# Patient Record
Sex: MALE | Race: WHITE | NOT HISPANIC OR LATINO | URBAN - NONMETROPOLITAN AREA
[De-identification: names, ages, dates, MRNs, and addresses within clinical notes are randomized per-mention and may not be internally consistent; named-entity substitution may affect disease eponyms.]

---

## 2023-05-22 ENCOUNTER — OFFICE VISIT (OUTPATIENT)
Dept: PRIMARY CARE | Facility: CLINIC | Age: 68
End: 2023-05-22
Payer: MEDICARE

## 2023-05-22 VITALS
TEMPERATURE: 98.6 F | WEIGHT: 167.8 LBS | HEIGHT: 68 IN | OXYGEN SATURATION: 96 % | DIASTOLIC BLOOD PRESSURE: 74 MMHG | SYSTOLIC BLOOD PRESSURE: 132 MMHG | BODY MASS INDEX: 25.43 KG/M2 | HEART RATE: 88 BPM

## 2023-05-22 DIAGNOSIS — R42 VERTIGO: Primary | ICD-10-CM

## 2023-05-22 PROBLEM — N40.0 BPH (BENIGN PROSTATIC HYPERPLASIA): Status: ACTIVE | Noted: 2023-05-22

## 2023-05-22 PROBLEM — Z87.891 FORMER SMOKER: Status: ACTIVE | Noted: 2023-05-22

## 2023-05-22 PROCEDURE — 1159F MED LIST DOCD IN RCRD: CPT | Performed by: PHYSICIAN ASSISTANT

## 2023-05-22 PROCEDURE — 99204 OFFICE O/P NEW MOD 45 MIN: CPT | Performed by: PHYSICIAN ASSISTANT

## 2023-05-22 PROCEDURE — 1160F RVW MEDS BY RX/DR IN RCRD: CPT | Performed by: PHYSICIAN ASSISTANT

## 2023-05-22 PROCEDURE — 1036F TOBACCO NON-USER: CPT | Performed by: PHYSICIAN ASSISTANT

## 2023-05-22 RX ORDER — CIPROFLOXACIN 500 MG/1
TABLET ORAL 2 TIMES DAILY
COMMUNITY
Start: 2021-03-03 | End: 2023-05-22 | Stop reason: WASHOUT

## 2023-05-22 RX ORDER — MECLIZINE HYDROCHLORIDE 25 MG/1
25 TABLET ORAL 2 TIMES DAILY
COMMUNITY
Start: 2023-05-13 | End: 2023-05-22 | Stop reason: SDUPTHER

## 2023-05-22 RX ORDER — ASPIRIN 81 MG/1
81 TABLET ORAL
COMMUNITY
Start: 2023-04-24

## 2023-05-22 RX ORDER — ATORVASTATIN CALCIUM 20 MG/1
20 TABLET, FILM COATED ORAL DAILY
COMMUNITY

## 2023-05-22 RX ORDER — TAMSULOSIN HYDROCHLORIDE 0.4 MG/1
CAPSULE ORAL
COMMUNITY
Start: 2022-10-26

## 2023-05-22 RX ORDER — ATORVASTATIN CALCIUM 80 MG/1
80 TABLET, FILM COATED ORAL DAILY
COMMUNITY
Start: 2023-05-13 | End: 2023-05-22 | Stop reason: WASHOUT

## 2023-05-22 RX ORDER — ATORVASTATIN CALCIUM 20 MG/1
20 TABLET, FILM COATED ORAL NIGHTLY
COMMUNITY
Start: 2022-10-06 | End: 2023-05-22 | Stop reason: WASHOUT

## 2023-05-22 RX ORDER — MECLIZINE HYDROCHLORIDE 25 MG/1
25 TABLET ORAL 2 TIMES DAILY PRN
Qty: 30 TABLET | Refills: 2 | Status: SHIPPED | OUTPATIENT
Start: 2023-05-22 | End: 2023-09-06 | Stop reason: SDUPTHER

## 2023-05-22 RX ORDER — TRAMADOL HYDROCHLORIDE 50 MG/1
50 TABLET ORAL 2 TIMES DAILY PRN
COMMUNITY
Start: 2023-05-10

## 2023-05-22 NOTE — PROGRESS NOTES
Subjective   Patient ID: Chato Reilly is a 67 y.o. male who presents for New Patient Visit and Hospital Follow-up (Was in the Friday before last for vertigo. ).    HPI   Chato Reilly is a 67 y.o. year old male patient with presenting to clinic with concern for   Chief Complaint   Patient presents with    New Patient Visit    Hospital Follow-up     Was in the Friday before last for vertigo.            Patient Active Problem List   Diagnosis    Stented coronary artery    Low back pain without sciatica    Former smoker    BPH (benign prostatic hyperplasia)       History reviewed. No pertinent past medical history.   Past Surgical History:   Procedure Laterality Date    MR HEAD ANGIO WO IV CONTRAST  5/12/2023    MR HEAD ANGIO WO IV CONTRAST 5/12/2023 GEN MRI    MR NECK ANGIO WO IV CONTRAST  5/12/2023    MR NECK ANGIO WO IV CONTRAST 5/12/2023 GEN MRI      No family history on file.   Social History     Tobacco Use    Smoking status: Never    Smokeless tobacco: Never   Vaping Use    Vaping status: Not on file   Substance Use Topics    Alcohol use: Never                      Current Outpatient Medications:     aspirin 81 mg EC tablet, Take 1 tablet (81 mg) by mouth once daily., Disp: , Rfl:     FINASTERIDE ORAL, Take by mouth., Disp: , Rfl:     tamsulosin (Flomax) 0.4 mg 24 hr capsule, , Disp: , Rfl:     traMADol (Ultram) 50 mg tablet, Take 1 tablet (50 mg) by mouth 2 times a day as needed., Disp: , Rfl:     atorvastatin (Lipitor) 20 mg tablet, Take 1 tablet (20 mg) by mouth once daily., Disp: , Rfl:     meclizine (Antivert) 25 mg tablet, Take 1 tablet (25 mg) by mouth 2 times a day as needed for dizziness., Disp: 30 tablet, Rfl: 2          Review of Systems  Review of Systems:   Constitutional: Denies fever  HEENT: Denies ST, earache  CVS: Denies Chest pain  Pulmonary: Denies wheezing, SOB  GI: Denies N/V  : Denies dysuria  Musculoskeletal:  Denies myalgia  Neuro: Denies focal weakness or numbness.  Skin: Denies  "Rashes.  *Review of Systems is negative unless otherwise mentioned in HPI or ROS above.      Objective   /74   Pulse 88   Temp 37 °C (98.6 °F)   Ht 1.727 m (5' 8\")   Wt 76.1 kg (167 lb 12.8 oz)   SpO2 96%   BMI 25.51 kg/m²     Physical Exam  Physical exam:  /74   Pulse 88   Temp 37 °C (98.6 °F)   Ht 1.727 m (5' 8\")   Wt 76.1 kg (167 lb 12.8 oz)   SpO2 96%   BMI 25.51 kg/m²  reviewed Body mass index is 25.51 kg/m².   Constitutional: NAD. Afebrile. Resting comfortably.  ENT: Nasal mucosa and oropharynx: moist oral mucosa. Posterior oropharynx nonerythematous. Modrerate clear posterior pharyngeal streaking.  TM: Bilat TM nonerythematous, pearly grey, landmarks intact. EAC wnl bilat.  Eyes: PERRLA. EOM intact.   Lymph: No anterior cervical chain or submandibular lymphadenopathy. No sentinel lymph nodes.  Cardiac: Regular rate & rhythm. No murmur, gallops, or rubs.  Pulmonary: Lungs clear to auscultation bilaterally with good aeration. No wheezes, rhonchi, or rales.   GI: Soft, Nontender, nondistended. No guarding. Normal BS x4.  : No suprapubic tenderness. No CVA tenderness to percussion.   Musculoskeletal: No peripheral edema.   Skin: No evidence of trauma. No rashes  Neuro: No focal neuro deficits. Normal gait without assistive devices.  Psych: Intact judgement and insight.    Assessment/Plan   Problem List Items Addressed This Visit    None  Visit Diagnoses       Vertigo    -  Primary    Relevant Medications    meclizine (Antivert) 25 mg tablet               "

## 2023-08-31 ENCOUNTER — TELEPHONE (OUTPATIENT)
Dept: PRIMARY CARE | Facility: CLINIC | Age: 68
End: 2023-08-31
Payer: MEDICARE

## 2023-08-31 DIAGNOSIS — R42 VERTIGO: Primary | ICD-10-CM

## 2023-09-06 DIAGNOSIS — R42 VERTIGO: ICD-10-CM

## 2023-09-06 RX ORDER — MECLIZINE HYDROCHLORIDE 25 MG/1
25 TABLET ORAL 2 TIMES DAILY PRN
Qty: 90 TABLET | Refills: 2 | Status: SHIPPED | OUTPATIENT
Start: 2023-09-06 | End: 2023-12-12

## 2023-11-27 ENCOUNTER — APPOINTMENT (OUTPATIENT)
Dept: PRIMARY CARE | Facility: CLINIC | Age: 68
End: 2023-11-27
Payer: MEDICARE

## 2023-12-12 ENCOUNTER — OFFICE VISIT (OUTPATIENT)
Dept: PRIMARY CARE | Facility: CLINIC | Age: 68
End: 2023-12-12
Payer: MEDICARE

## 2023-12-12 VITALS
TEMPERATURE: 98.6 F | WEIGHT: 169.8 LBS | HEIGHT: 68 IN | DIASTOLIC BLOOD PRESSURE: 66 MMHG | BODY MASS INDEX: 25.73 KG/M2 | OXYGEN SATURATION: 99 % | HEART RATE: 75 BPM | SYSTOLIC BLOOD PRESSURE: 124 MMHG

## 2023-12-12 DIAGNOSIS — Z87.891 HISTORY OF TOBACCO USE, PRESENTING HAZARDS TO HEALTH: ICD-10-CM

## 2023-12-12 DIAGNOSIS — E78.2 MIXED HYPERLIPIDEMIA: Primary | ICD-10-CM

## 2023-12-12 PROCEDURE — 1160F RVW MEDS BY RX/DR IN RCRD: CPT | Performed by: PHYSICIAN ASSISTANT

## 2023-12-12 PROCEDURE — 99214 OFFICE O/P EST MOD 30 MIN: CPT | Performed by: PHYSICIAN ASSISTANT

## 2023-12-12 PROCEDURE — 1036F TOBACCO NON-USER: CPT | Performed by: PHYSICIAN ASSISTANT

## 2023-12-12 PROCEDURE — 1159F MED LIST DOCD IN RCRD: CPT | Performed by: PHYSICIAN ASSISTANT

## 2023-12-12 ASSESSMENT — PATIENT HEALTH QUESTIONNAIRE - PHQ9
1. LITTLE INTEREST OR PLEASURE IN DOING THINGS: NOT AT ALL
SUM OF ALL RESPONSES TO PHQ9 QUESTIONS 1 AND 2: 0
2. FEELING DOWN, DEPRESSED OR HOPELESS: NOT AT ALL
2. FEELING DOWN, DEPRESSED OR HOPELESS: NOT AT ALL
SUM OF ALL RESPONSES TO PHQ9 QUESTIONS 1 AND 2: 0
1. LITTLE INTEREST OR PLEASURE IN DOING THINGS: NOT AT ALL

## 2023-12-12 NOTE — PROGRESS NOTES
Subjective     HPI   Chato Reilly is a 68 y.o. year old male patient with presenting to clinic with concern for   Chief Complaint   Patient presents with    Follow-up       Low Back pain- Pain Mgt- Tyler Christine [facility]    [place] [facility] [place]. Good diet.     BPH- took tamsulosin 0.4mg every day- not currently taking.  Finasteride- currently taking.  Vertigo- intermittently and rarely    Benign localized hyperplasia of prostate without urinary obstruction and other lower urinary tract symptoms (LUTS)   Degeneration of intervertebral disc,neck and back   HTN (hypertension)   Hyperlipidemia   NSTEMI (non-ST elevated myocardial infarction) (HCC) 2011   Osteoarthrosis generalized     Needs LDCT- 46 pack year. Has NOT had screening. Quit 2016 after stent.     Patient Active Problem List   Diagnosis    Stented coronary artery    Low back pain without sciatica    Former smoker    BPH (benign prostatic hyperplasia)       Past Medical History:   Diagnosis Date    HLD (hyperlipidemia)     resolved. On Lipitor to prevent MI    HTN (hypertension)     MI (myocardial infarction) (CMS/HCC) 2021    NSTEMI, stent placed      Past Surgical History:   Procedure Laterality Date    CORONARY STENT PLACEMENT      MR HEAD ANGIO WO IV CONTRAST  2023    MR HEAD ANGIO WO IV CONTRAST 2023 GEN MRI    MR NECK ANGIO WO IV CONTRAST  2023    MR NECK ANGIO WO IV CONTRAST 2023 GEN MRI      Family History   Problem Relation Name Age of Onset    No Known Problems Mother      Heart attack Father  36        Multiple heart attacks    Heart disease Father          CABG x 5      Social History     Tobacco Use    Smoking status: Former     Packs/day: 1.00     Years: 46.00     Additional pack years: 0.00     Total pack years: 46.00     Types: Cigarettes     Start date:      Quit date: 2016     Years since quittin.9    Smokeless tobacco: Former     Types: Chew    Tobacco  "comments:     Stopped smoking when he got his cardiac stent   Substance Use Topics    Alcohol use: Never        Current Outpatient Medications:     aspirin 81 mg EC tablet, Take 1 tablet (81 mg) by mouth once daily., Disp: , Rfl:     FINASTERIDE ORAL, Take by mouth., Disp: , Rfl:     meclizine (Antivert) 25 mg tablet, Take 1 tablet (25 mg) by mouth 2 times a day as needed for dizziness., Disp: 90 tablet, Rfl: 2    tamsulosin (Flomax) 0.4 mg 24 hr capsule, , Disp: , Rfl:     traMADol (Ultram) 50 mg tablet, Take 1 tablet (50 mg) by mouth 2 times a day as needed., Disp: , Rfl:     atorvastatin (Lipitor) 20 mg tablet, Take 1 tablet (20 mg) by mouth once daily., Disp: , Rfl:      Review of Systems  Constitutional: Denies fever  HEENT: Denies ST, earache  CVS: Denies Chest pain  Pulmonary: Denies wheezing, SOB  GI: Denies N/V  : Denies dysuria  Musculoskeletal:  Denies myalgia  Neuro: Denies focal weakness or numbness.  Skin: Denies Rashes.  *Review of Systems is negative unless otherwise mentioned in HPI or ROS above.    Objective   /66   Pulse 75   Temp 37 °C (98.6 °F)   Ht 1.727 m (5' 8\")   Wt 77 kg (169 lb 12.8 oz)   SpO2 99%   BMI 25.82 kg/m²  reviewed Body mass index is 25.82 kg/m².     Physical Exam  Constitutional: NAD.  Resting comfortably.  Head: Atraumatic, normocephalic.  ENT: Moist oral mucosa. Nasal mucosa wnl.   Cardiac: Regular rate & rhythm.   Pulmonary: Lungs clear bilat  GI: Soft, Nontender, nondistended.   Musculoskeletal: No peripheral edema.   Skin: No evidence of trauma. No rashes  Psych: Intact judgement and insight.    Willing to take atorvastatin and discussed    Declined PNE vaccine    .Assessment/Plan   Problem List Items Addressed This Visit    None  Visit Diagnoses         Codes    Mixed hyperlipidemia    -  Primary E78.2    History of tobacco use, presenting hazards to health     Z87.891    Relevant Orders    CT lung screening low dose            "

## 2024-06-16 NOTE — PROGRESS NOTES
"Subjective   HPI   Chato Reilly is a 68 y.o. year old male patient with presenting to clinic with concern for Medicare Visit     Chief Complaint   Patient presents with    Medicare Annual Wellness Visit Initial     Hands and/or feet burn and itch at night.         Labs due - [facility] incl TSH PSA and Lipids, will drop off records.  A1c 1 year ago was 5.6%  Request colonoscopy records- mentor - [facility]  LDCT ordered December 2023, not done yet.     Itching between fingers and toes. Back of hands and palms. Wakes him up at night 2 am.   Calcium supplements, no vitamin B supplements.     Pain Mgt- Tyler Christine [facility], on tramadol. Hx lumbar spondylosis. Sees chiropractor also.     Urology- finasteride, catheter  Urologist? At [facility] \"Dr Woodard\" PSA is <1 per pt.    Your chest CT is ok. Nodules are unchanged and stable. We will repeat in 1 year for your annual screening.      Patient Care Team:  Jackie Magdaleno PA-C as PCP - General (Family Medicine)     Specialists    Past Medical, Surgical, and Family History reviewed and updated in chart.    Reviewed all medications by prescribing practitioner or clinical pharmacist (such as prescriptions, OTCs, herbal therapies and supplements) and documented in the medical record.     Preventative Health   Health Maintenance Due   Topic Date Due    Hepatitis C Screening  Never done    DTaP/Tdap/Td Vaccines (1 - Tdap) Never done    Screening for lung cancer  Never done    Abdominal Aortic Aneurysm (AAA) screening  Never done    Pneumococcal Vaccination (1 of 1 - PCV) Never done            Topic Date Due    DTaP/Tdap/Td Vaccines (1 - Tdap) Never done        Immunizations Reviewed    There is no immunization history on file for this patient.     Problem List Reviewed  Patient Active Problem List   Diagnosis    Stented coronary artery    Low back pain without sciatica    Former smoker    BPH (benign prostatic hyperplasia)       Past Medical History: "   Diagnosis Date    HLD (hyperlipidemia)     resolved. On Lipitor to prevent MI    HTN (hypertension)     MI (myocardial infarction) (Multi) 2021    NSTEMI, stent placed      Past Surgical History:   Procedure Laterality Date    CORONARY STENT PLACEMENT      MR HEAD ANGIO WO IV CONTRAST  2023    MR HEAD ANGIO WO IV CONTRAST 2023 GEN MRI    MR NECK ANGIO WO IV CONTRAST  2023    MR NECK ANGIO WO IV CONTRAST 2023 GEN MRI      Family History   Problem Relation Name Age of Onset    No Known Problems Mother      Heart attack Father  36        Multiple heart attacks    Heart disease Father          CABG x 5      Social History     Tobacco Use    Smoking status: Former     Current packs/day: 0.00     Average packs/day: 1 pack/day for 46.0 years (46.0 ttl pk-yrs)     Types: Cigarettes     Start date:      Quit date:      Years since quittin.4    Smokeless tobacco: Former     Types: Chew    Tobacco comments:     Stopped smoking when he got his cardiac stent   Substance Use Topics    Alcohol use: Never        Medications Reviewed  Current Outpatient Medications on File Prior to Visit   Medication Sig Dispense Refill    aspirin 81 mg EC tablet Take 1 tablet (81 mg) by mouth once daily.      FINASTERIDE ORAL Take by mouth.      tamsulosin (Flomax) 0.4 mg 24 hr capsule       traMADol (Ultram) 50 mg tablet Take 1 tablet (50 mg) by mouth 2 times a day as needed.      atorvastatin (Lipitor) 20 mg tablet Take 1 tablet (20 mg) by mouth once daily.       No current facility-administered medications on file prior to visit.        Review of Systems  Constitutional: Denies fever  HEENT: Denies ST, earache  CVS: Denies Chest pain  Pulmonary: Denies wheezing, SOB  GI: Denies N/V  : Denies dysuria  Musculoskeletal:  Denies myalgia  Neuro: Denies focal weakness or numbness.  Skin: Denies Rashes.  *Review of Systems is negative unless otherwise mentioned in HPI or ROS above.      @OBJECTIVEBEGIN  BP  "150/82   Pulse 56   Temp 35.9 °C (96.6 °F)   Ht 1.727 m (5' 8\")   Wt 71.7 kg (158 lb)   SpO2 98%   BMI 24.02 kg/m²  reviewed Body mass index is 24.02 kg/m².     Physical Exam  Constitutional: NAD. Afebrile. Resting comfortably.  ENT: Nasal mucosa and oropharynx: moist oral mucosa. Posterior oropharynx nonerythematous. No posterior pharyngeal streaking.  Eyes: PERRLA. EOM intact. No vertical or circular nystagmus.  Lymph: No anterior cervical chain or submandibular lymphadenopathy. No sentinel lymph nodes.  Cardiac: Regular rate & rhythm. No murmur, gallops, or rubs.  Pulmonary: Lungs clear to auscultation bilaterally with good aeration. No wheezes, rhonchi, or rales. Normal WOB.  GI: Soft, Nontender, nondistended. No guarding. Normal BS x4.  : No suprapubic tenderness. No CVA tenderness to percussion.   Musculoskeletal: No peripheral edema. Palmar contracture bilat R>L   Skin: No evidence of trauma. No rashes,   Neuro: No focal neuro deficits. Normal gait without assistive devices.  Psych: Intact judgement and insight.      .Assessment/Plan   Problem List Items Addressed This Visit    None  Visit Diagnoses         Codes    Medicare annual wellness visit, subsequent    -  Primary Z00.00    History of tobacco use, presenting hazards to health     Z87.891    Relevant Orders    CT lung screening low dose    Dupuytren contracture     M72.0    Itching     L29.9    Relevant Medications    permethrin (Elimite) 5 % cream            "

## 2024-06-17 ENCOUNTER — APPOINTMENT (OUTPATIENT)
Dept: PRIMARY CARE | Facility: CLINIC | Age: 69
End: 2024-06-17
Payer: MEDICARE

## 2024-06-17 VITALS
TEMPERATURE: 96.6 F | OXYGEN SATURATION: 98 % | WEIGHT: 158 LBS | DIASTOLIC BLOOD PRESSURE: 82 MMHG | SYSTOLIC BLOOD PRESSURE: 150 MMHG | BODY MASS INDEX: 23.95 KG/M2 | HEART RATE: 56 BPM | HEIGHT: 68 IN

## 2024-06-17 DIAGNOSIS — Z87.891 HISTORY OF TOBACCO USE, PRESENTING HAZARDS TO HEALTH: ICD-10-CM

## 2024-06-17 DIAGNOSIS — L29.9 ITCHING: ICD-10-CM

## 2024-06-17 DIAGNOSIS — Z00.00 MEDICARE ANNUAL WELLNESS VISIT, SUBSEQUENT: Primary | ICD-10-CM

## 2024-06-17 DIAGNOSIS — M72.0 DUPUYTREN CONTRACTURE: ICD-10-CM

## 2024-06-17 PROCEDURE — G0439 PPPS, SUBSEQ VISIT: HCPCS | Performed by: PHYSICIAN ASSISTANT

## 2024-06-17 PROCEDURE — 1124F ACP DISCUSS-NO DSCNMKR DOCD: CPT | Performed by: PHYSICIAN ASSISTANT

## 2024-06-17 PROCEDURE — 1036F TOBACCO NON-USER: CPT | Performed by: PHYSICIAN ASSISTANT

## 2024-06-17 PROCEDURE — 1160F RVW MEDS BY RX/DR IN RCRD: CPT | Performed by: PHYSICIAN ASSISTANT

## 2024-06-17 PROCEDURE — 1159F MED LIST DOCD IN RCRD: CPT | Performed by: PHYSICIAN ASSISTANT

## 2024-06-17 RX ORDER — PERMETHRIN 50 MG/G
CREAM TOPICAL ONCE
Qty: 60 G | Refills: 0 | Status: SHIPPED | OUTPATIENT
Start: 2024-06-17 | End: 2024-06-17

## 2024-06-17 ASSESSMENT — PATIENT HEALTH QUESTIONNAIRE - PHQ9
SUM OF ALL RESPONSES TO PHQ9 QUESTIONS 1 AND 2: 0
2. FEELING DOWN, DEPRESSED OR HOPELESS: NOT AT ALL
1. LITTLE INTEREST OR PLEASURE IN DOING THINGS: NOT AT ALL

## 2024-07-01 ENCOUNTER — APPOINTMENT (OUTPATIENT)
Dept: PRIMARY CARE | Facility: CLINIC | Age: 69
End: 2024-07-01
Payer: MEDICARE

## 2024-08-18 ENCOUNTER — APPOINTMENT (OUTPATIENT)
Dept: CARDIOLOGY | Facility: HOSPITAL | Age: 69
End: 2024-08-18
Payer: OTHER GOVERNMENT

## 2024-08-18 ENCOUNTER — HOSPITAL ENCOUNTER (EMERGENCY)
Facility: HOSPITAL | Age: 69
Discharge: HOME | End: 2024-08-18
Payer: OTHER GOVERNMENT

## 2024-08-18 ENCOUNTER — APPOINTMENT (OUTPATIENT)
Dept: RADIOLOGY | Facility: HOSPITAL | Age: 69
End: 2024-08-18
Payer: OTHER GOVERNMENT

## 2024-08-18 VITALS
TEMPERATURE: 96.6 F | BODY MASS INDEX: 25.46 KG/M2 | DIASTOLIC BLOOD PRESSURE: 78 MMHG | HEART RATE: 71 BPM | RESPIRATION RATE: 16 BRPM | WEIGHT: 167.99 LBS | SYSTOLIC BLOOD PRESSURE: 152 MMHG | OXYGEN SATURATION: 95 % | HEIGHT: 68 IN

## 2024-08-18 DIAGNOSIS — R10.10 PAIN OF UPPER ABDOMEN: Primary | ICD-10-CM

## 2024-08-18 DIAGNOSIS — K65.4 MESENTERIC PANNICULITIS (MULTI): ICD-10-CM

## 2024-08-18 DIAGNOSIS — R10.84 GENERALIZED ABDOMINAL PAIN: ICD-10-CM

## 2024-08-18 LAB
ALBUMIN SERPL BCP-MCNC: 4.2 G/DL (ref 3.4–5)
ALP SERPL-CCNC: 45 U/L (ref 33–136)
ALT SERPL W P-5'-P-CCNC: 21 U/L (ref 10–52)
ANION GAP SERPL CALC-SCNC: 16 MMOL/L (ref 10–20)
APPEARANCE UR: CLEAR
AST SERPL W P-5'-P-CCNC: 16 U/L (ref 9–39)
BASOPHILS # BLD AUTO: 0.1 X10*3/UL (ref 0–0.1)
BASOPHILS NFR BLD AUTO: 0.9 %
BILIRUB SERPL-MCNC: 0.5 MG/DL (ref 0–1.2)
BILIRUB UR STRIP.AUTO-MCNC: NEGATIVE MG/DL
BNP SERPL-MCNC: 12 PG/ML (ref 0–99)
BUN SERPL-MCNC: 29 MG/DL (ref 6–23)
CALCIUM SERPL-MCNC: 9.5 MG/DL (ref 8.6–10.3)
CARDIAC TROPONIN I PNL SERPL HS: <3 NG/L (ref 0–20)
CARDIAC TROPONIN I PNL SERPL HS: <3 NG/L (ref 0–20)
CHLORIDE SERPL-SCNC: 106 MMOL/L (ref 98–107)
CO2 SERPL-SCNC: 22 MMOL/L (ref 21–32)
COLOR UR: YELLOW
CREAT SERPL-MCNC: 1.02 MG/DL (ref 0.5–1.3)
EGFRCR SERPLBLD CKD-EPI 2021: 81 ML/MIN/1.73M*2
EOSINOPHIL # BLD AUTO: 0.26 X10*3/UL (ref 0–0.7)
EOSINOPHIL NFR BLD AUTO: 2.4 %
ERYTHROCYTE [DISTWIDTH] IN BLOOD BY AUTOMATED COUNT: 13 % (ref 11.5–14.5)
GLUCOSE BLD MANUAL STRIP-MCNC: 89 MG/DL (ref 74–99)
GLUCOSE SERPL-MCNC: 109 MG/DL (ref 74–99)
GLUCOSE UR STRIP.AUTO-MCNC: NORMAL MG/DL
HCT VFR BLD AUTO: 42 % (ref 41–52)
HGB BLD-MCNC: 14.2 G/DL (ref 13.5–17.5)
IMM GRANULOCYTES # BLD AUTO: 0.03 X10*3/UL (ref 0–0.7)
IMM GRANULOCYTES NFR BLD AUTO: 0.3 % (ref 0–0.9)
KETONES UR STRIP.AUTO-MCNC: ABNORMAL MG/DL
LACTATE SERPL-SCNC: 1.6 MMOL/L (ref 0.4–2)
LACTATE SERPL-SCNC: 2.4 MMOL/L (ref 0.4–2)
LEUKOCYTE ESTERASE UR QL STRIP.AUTO: ABNORMAL
LIPASE SERPL-CCNC: 3 U/L (ref 9–82)
LYMPHOCYTES # BLD AUTO: 4.37 X10*3/UL (ref 1.2–4.8)
LYMPHOCYTES NFR BLD AUTO: 40.7 %
MAGNESIUM SERPL-MCNC: 1.97 MG/DL (ref 1.6–2.4)
MCH RBC QN AUTO: 29.5 PG (ref 26–34)
MCHC RBC AUTO-ENTMCNC: 33.8 G/DL (ref 32–36)
MCV RBC AUTO: 87 FL (ref 80–100)
MONOCYTES # BLD AUTO: 1.27 X10*3/UL (ref 0.1–1)
MONOCYTES NFR BLD AUTO: 11.8 %
MUCOUS THREADS #/AREA URNS AUTO: NORMAL /LPF
NEUTROPHILS # BLD AUTO: 4.7 X10*3/UL (ref 1.2–7.7)
NEUTROPHILS NFR BLD AUTO: 43.9 %
NITRITE UR QL STRIP.AUTO: NEGATIVE
NRBC BLD-RTO: 0 /100 WBCS (ref 0–0)
PH UR STRIP.AUTO: 5.5 [PH]
PLATELET # BLD AUTO: 344 X10*3/UL (ref 150–450)
POTASSIUM SERPL-SCNC: 3.6 MMOL/L (ref 3.5–5.3)
PROT SERPL-MCNC: 7.3 G/DL (ref 6.4–8.2)
PROT UR STRIP.AUTO-MCNC: NEGATIVE MG/DL
RBC # BLD AUTO: 4.81 X10*6/UL (ref 4.5–5.9)
RBC # UR STRIP.AUTO: NEGATIVE /UL
RBC #/AREA URNS AUTO: NORMAL /HPF
SARS-COV-2 RNA RESP QL NAA+PROBE: NOT DETECTED
SODIUM SERPL-SCNC: 140 MMOL/L (ref 136–145)
SP GR UR STRIP.AUTO: 1.03
UROBILINOGEN UR STRIP.AUTO-MCNC: NORMAL MG/DL
WBC # BLD AUTO: 10.7 X10*3/UL (ref 4.4–11.3)
WBC #/AREA URNS AUTO: NORMAL /HPF

## 2024-08-18 PROCEDURE — 2500000004 HC RX 250 GENERAL PHARMACY W/ HCPCS (ALT 636 FOR OP/ED)

## 2024-08-18 PROCEDURE — 83735 ASSAY OF MAGNESIUM: CPT

## 2024-08-18 PROCEDURE — 82947 ASSAY GLUCOSE BLOOD QUANT: CPT

## 2024-08-18 PROCEDURE — 99285 EMERGENCY DEPT VISIT HI MDM: CPT | Mod: 25

## 2024-08-18 PROCEDURE — 83690 ASSAY OF LIPASE: CPT

## 2024-08-18 PROCEDURE — 80053 COMPREHEN METABOLIC PANEL: CPT

## 2024-08-18 PROCEDURE — 96375 TX/PRO/DX INJ NEW DRUG ADDON: CPT

## 2024-08-18 PROCEDURE — 85025 COMPLETE CBC W/AUTO DIFF WBC: CPT

## 2024-08-18 PROCEDURE — 83880 ASSAY OF NATRIURETIC PEPTIDE: CPT

## 2024-08-18 PROCEDURE — 96374 THER/PROPH/DIAG INJ IV PUSH: CPT

## 2024-08-18 PROCEDURE — 96361 HYDRATE IV INFUSION ADD-ON: CPT

## 2024-08-18 PROCEDURE — 2550000001 HC RX 255 CONTRASTS

## 2024-08-18 PROCEDURE — 83605 ASSAY OF LACTIC ACID: CPT

## 2024-08-18 PROCEDURE — 87635 SARS-COV-2 COVID-19 AMP PRB: CPT

## 2024-08-18 PROCEDURE — 93005 ELECTROCARDIOGRAM TRACING: CPT

## 2024-08-18 PROCEDURE — 71275 CT ANGIOGRAPHY CHEST: CPT

## 2024-08-18 PROCEDURE — 84484 ASSAY OF TROPONIN QUANT: CPT

## 2024-08-18 PROCEDURE — 71275 CT ANGIOGRAPHY CHEST: CPT | Performed by: STUDENT IN AN ORGANIZED HEALTH CARE EDUCATION/TRAINING PROGRAM

## 2024-08-18 PROCEDURE — 81001 URINALYSIS AUTO W/SCOPE: CPT

## 2024-08-18 PROCEDURE — 36415 COLL VENOUS BLD VENIPUNCTURE: CPT

## 2024-08-18 PROCEDURE — 74174 CTA ABD&PLVS W/CONTRAST: CPT | Performed by: STUDENT IN AN ORGANIZED HEALTH CARE EDUCATION/TRAINING PROGRAM

## 2024-08-18 PROCEDURE — 87086 URINE CULTURE/COLONY COUNT: CPT | Mod: GENLAB

## 2024-08-18 RX ORDER — KETOROLAC TROMETHAMINE 15 MG/ML
15 INJECTION, SOLUTION INTRAMUSCULAR; INTRAVENOUS ONCE
Status: COMPLETED | OUTPATIENT
Start: 2024-08-18 | End: 2024-08-18

## 2024-08-18 RX ORDER — KETOROLAC TROMETHAMINE 10 MG/1
10 TABLET, FILM COATED ORAL EVERY 6 HOURS PRN
Qty: 20 TABLET | Refills: 0 | Status: SHIPPED | OUTPATIENT
Start: 2024-08-18 | End: 2024-08-23

## 2024-08-18 RX ORDER — ONDANSETRON 4 MG/1
4 TABLET, ORALLY DISINTEGRATING ORAL EVERY 8 HOURS PRN
Qty: 30 TABLET | Refills: 0 | Status: SHIPPED | OUTPATIENT
Start: 2024-08-18

## 2024-08-18 RX ORDER — ONDANSETRON HYDROCHLORIDE 2 MG/ML
4 INJECTION, SOLUTION INTRAVENOUS ONCE
Status: COMPLETED | OUTPATIENT
Start: 2024-08-18 | End: 2024-08-18

## 2024-08-18 ASSESSMENT — PAIN DESCRIPTION - LOCATION: LOCATION: ABDOMEN

## 2024-08-18 ASSESSMENT — COLUMBIA-SUICIDE SEVERITY RATING SCALE - C-SSRS
1. IN THE PAST MONTH, HAVE YOU WISHED YOU WERE DEAD OR WISHED YOU COULD GO TO SLEEP AND NOT WAKE UP?: NO
6. HAVE YOU EVER DONE ANYTHING, STARTED TO DO ANYTHING, OR PREPARED TO DO ANYTHING TO END YOUR LIFE?: NO
2. HAVE YOU ACTUALLY HAD ANY THOUGHTS OF KILLING YOURSELF?: NO

## 2024-08-18 ASSESSMENT — PAIN SCALES - GENERAL
PAINLEVEL_OUTOF10: 0 - NO PAIN
PAINLEVEL_OUTOF10: 10 - WORST POSSIBLE PAIN

## 2024-08-18 ASSESSMENT — PAIN DESCRIPTION - PAIN TYPE: TYPE: ACUTE PAIN

## 2024-08-18 ASSESSMENT — PAIN - FUNCTIONAL ASSESSMENT
PAIN_FUNCTIONAL_ASSESSMENT: 0-10
PAIN_FUNCTIONAL_ASSESSMENT: 0-10

## 2024-08-18 ASSESSMENT — PAIN DESCRIPTION - ORIENTATION: ORIENTATION: UPPER

## 2024-08-18 ASSESSMENT — PAIN DESCRIPTION - DESCRIPTORS
DESCRIPTORS: SHARP
DESCRIPTORS: DISCOMFORT

## 2024-08-18 NOTE — ED PROVIDER NOTES
HPI   Chief Complaint   Patient presents with   • Abdominal Pain     Pt arrives to [facility] via EMS, presenting with generalized upper abdominal pain that began approx 20 minutes PTA to ED. Pt states he became severely nauseated and diaphoretic, puddles of sweat on floor. No emesis noted. Pt also reports mild SOB, 97% on RA for EMS. Pt A&Ox4, resp appear eased and unlabored, skin diaphoretic. Pt states his pain is 10/10, describing the pain as sharp. EMS reports no meds given PTA to ED.        Patient is a 68-year-old male with significant PMH of hypertension, hyperlipidemia, MI with 1 cardiac stent placed in 2021 presents to the ED with cc of sudden abdominal pain x 20 minutes prior to arrival.  Patient states abdominal pain is upper abdomen denies any radiation.  Patient denies any injury to the area.  When the pain came on patient felt nauseous but had no emesis.  Patient was severely diaphoretic and squad reports pools of sweat on the floor.  Patient states this does not feel similar to when he had an MI.  Patient states when he had an MI in 2021 he had confusion as his only complaint.  Patient denies any fever chills diarrhea constipation chest pain or shortness of breath.  Patient did endorse shortness of breath for the ambulance.  Patient denies any history of blood clots recent travel or surgery.  Patient denies any congestion cough pharyngitis.  Patient has had appendectomy denies any other abdominal surgeries believes he still has his gallbladder.  Patient denies any tobacco alcohol or street drug abuse.  Patient presents to the ED with cc of              Patient History   Past Medical History:   Diagnosis Date   • HLD (hyperlipidemia)     resolved. On Lipitor to prevent MI   • HTN (hypertension)    • MI (myocardial infarction) (Multi) 12/2021    NSTEMI, stent placed     Past Surgical History:   Procedure Laterality Date   • CORONARY STENT PLACEMENT     • MR HEAD ANGIO WO IV CONTRAST  05/12/2023    MR HEAD  ANGIO WO IV CONTRAST 2023 GEN MRI   • MR NECK ANGIO WO IV CONTRAST  2023    MR NECK ANGIO WO IV CONTRAST 2023 GEN MRI     Family History   Problem Relation Name Age of Onset   • No Known Problems Mother     • Heart attack Father  36        Multiple heart attacks   • Heart disease Father          CABG x 5     Social History     Tobacco Use   • Smoking status: Former     Current packs/day: 0.00     Average packs/day: 1 pack/day for 46.0 years (46.0 ttl pk-yrs)     Types: Cigarettes     Start date:      Quit date: 2016     Years since quittin.6   • Smokeless tobacco: Former     Types: Chew   • Tobacco comments:     Stopped smoking when he got his cardiac stent   Vaping Use   • Vaping status: Never Used   Substance Use Topics   • Alcohol use: Never   • Drug use: Never       Physical Exam   ED Triage Vitals [24 1829]   Temp Heart Rate Respirations BP   -- 55 18 (!) 164/91      Pulse Ox Temp src Heart Rate Source Patient Position   100 % -- Monitor --      BP Location FiO2 (%)     -- --       Physical Exam  HENT:      Head: Normocephalic.   Cardiovascular:      Rate and Rhythm: Normal rate and regular rhythm.      Heart sounds: Normal heart sounds.   Pulmonary:      Effort: Pulmonary effort is normal.   Abdominal:      Palpations: Abdomen is soft.      Tenderness: There is abdominal tenderness in the right upper quadrant and left upper quadrant. There is no right CVA tenderness, left CVA tenderness, guarding or rebound.   Neurological:      General: No focal deficit present.      Mental Status: He is alert and oriented to person, place, and time.           ED Course & MDM   ED Course as of 24   Sun Aug 18, 2024   1830 ECG 12 lead  Sinus bradycardia.  Rate = 59.  Normal intervals.  No acute injury pattern. [BT]      ED Course User Index  [BT] Seth Ruiz DO         Diagnoses as of 24   Pain of upper abdomen                 No data recorded     Shanna Coma Scale  Score: 15 (08/18/24 1836 : Aure Drake RN)                           Medical Decision Making  Medical Decision Making:  Patient presented as described in HPI. Patient case including ROS, PE, and treatment and plan discussed with ED attending if attached as cosigner. Due to patients presentation orders completed include as documented.  Patient presents to the ED with cc of upper abdominal pain suddenly this evening.  Patient also endorses nausea and diaphoresis.  Patient has had appendectomy is unsure of other abdominal surgeries.  Patient is nontoxic-appearing, diaphoretic, abdomen is soft and tender to the upper abdomen lung sounds are clear no CVA tenderness.  Pending labs and imaging.  Labs are unremarkable.  Lipase is 3 lactate is 2.4 pending repeat.  Pending imaging.  Patient's care continued by ER attending and will be displayed once imaging returns.        Patient care discussed with: N/A  Social Determinants affecting care: N/A    Final diagnosis and disposition as below.  See CI      This note has been transcribed using voice recognition and may contain grammatical errors, misplaced words, incorrect words, incorrect phrases or other errors.        Labs Reviewed   CBC WITH AUTO DIFFERENTIAL - Abnormal       Result Value    WBC 10.7      nRBC 0.0      RBC 4.81      Hemoglobin 14.2      Hematocrit 42.0      MCV 87      MCH 29.5      MCHC 33.8      RDW 13.0      Platelets 344      Neutrophils % 43.9      Immature Granulocytes %, Automated 0.3      Lymphocytes % 40.7      Monocytes % 11.8      Eosinophils % 2.4      Basophils % 0.9      Neutrophils Absolute 4.70      Immature Granulocytes Absolute, Automated 0.03      Lymphocytes Absolute 4.37      Monocytes Absolute 1.27 (*)     Eosinophils Absolute 0.26      Basophils Absolute 0.10     COMPREHENSIVE METABOLIC PANEL - Abnormal    Glucose 109 (*)     Sodium 140      Potassium 3.6      Chloride 106      Bicarbonate 22      Anion Gap 16      Urea Nitrogen  29 (*)     Creatinine 1.02      eGFR 81      Calcium 9.5      Albumin 4.2      Alkaline Phosphatase 45      Total Protein 7.3      AST 16      Bilirubin, Total 0.5      ALT 21     LIPASE - Abnormal    Lipase 3 (*)     Narrative:     Venipuncture immediately after or during the administration of Metamizole may lead to falsely low results. Testing should be performed immediately prior to Metamizole dosing.   LACTATE - Abnormal    Lactate 2.4 (*)     Narrative:     Venipuncture immediately after or during the administration of Metamizole may lead to falsely low results. Testing should be performed immediately  prior to Metamizole dosing.   URINALYSIS WITH REFLEX CULTURE AND MICROSCOPIC - Abnormal    Color, Urine Yellow      Appearance, Urine Clear      Specific Gravity, Urine 1.029      pH, Urine 5.5      Protein, Urine NEGATIVE      Glucose, Urine Normal      Blood, Urine NEGATIVE      Ketones, Urine TRACE (*)     Bilirubin, Urine NEGATIVE      Urobilinogen, Urine Normal      Nitrite, Urine NEGATIVE      Leukocyte Esterase, Urine 75 Bernice/µL (*)    MAGNESIUM - Normal    Magnesium 1.97     B-TYPE NATRIURETIC PEPTIDE - Normal    BNP 12      Narrative:        <100 pg/mL - Heart failure unlikely  100-299 pg/mL - Intermediate probability of acute heart                  failure exacerbation. Correlate with clinical                  context and patient history.    >=300 pg/mL - Heart Failure likely. Correlate with clinical                  context and patient history.    BNP testing is performed using different testing methodology at [facility] than at other [facility] hospitals. Direct result comparisons should only be made within the same method.      SARS-COV-2 PCR - Normal    Coronavirus 2019, PCR Not Detected      Narrative:     This assay has received FDA Emergency Use Authorization (EUA) and is only authorized for the duration of time that circumstances exist to justify the authorization of the emergency use  of in vitro diagnostic tests for the detection of SARS-CoV-2 virus and/or diagnosis of COVID-19 infection under section 564(b)(1) of the Act, 21 U.S.C. 360bbb-3(b)(1). This assay is an in vitro diagnostic nucleic acid amplification test for the qualitative detection of SARS-CoV-2 from nasopharyngeal specimens and has been validated for use at [facility]. Negative results do not preclude COVID-19 infections and should not be used as the sole basis for diagnosis, treatment, or other management decisions.     SERIAL TROPONIN-INITIAL - Normal    Troponin I, High Sensitivity <3      Narrative:     Less than 99th percentile of normal range cutoff-  Female and children under 18 years old <14 ng/L; Male <21 ng/L: Negative  Repeat testing should be performed if clinically indicated.     Female and children under 18 years old 14-50 ng/L; Male 21-50 ng/L:  Consistent with possible cardiac damage and possible increased clinical   risk. Serial measurements may help to assess extent of myocardial damage.     >50 ng/L: Consistent with cardiac damage, increased clinical risk and  myocardial infarction. Serial measurements may help assess extent of   myocardial damage.      NOTE: Children less than 1 year old may have higher baseline troponin   levels and results should be interpreted in conjunction with the overall   clinical context.     NOTE: Troponin I testing is performed using a different   testing methodology at [facility] than at [facility]. Direct result comparisons should only   be made within the same method.   SERIAL TROPONIN, 1 HOUR - Normal    Troponin I, High Sensitivity <3      Narrative:     Less than 99th percentile of normal range cutoff-  Female and children under 18 years old <14 ng/L; Male <21 ng/L: Negative  Repeat testing should be performed if clinically indicated.     Female and children under 18 years old 14-50 ng/L; Male 21-50 ng/L:  Consistent with  possible cardiac damage and possible increased clinical   risk. Serial measurements may help to assess extent of myocardial damage.     >50 ng/L: Consistent with cardiac damage, increased clinical risk and  myocardial infarction. Serial measurements may help assess extent of   myocardial damage.      NOTE: Children less than 1 year old may have higher baseline troponin   levels and results should be interpreted in conjunction with the overall   clinical context.     NOTE: Troponin I testing is performed using a different   testing methodology at [facility] than at other   [facility]. Direct result comparisons should only   be made within the same method.   LACTATE - Normal    Lactate 1.6      Narrative:     Venipuncture immediately after or during the administration of Metamizole may lead to falsely low results. Testing should be performed immediately  prior to Metamizole dosing.   POCT GLUCOSE - Normal    POCT Glucose 89     URINE CULTURE   TROPONIN SERIES- (INITIAL, 1 HR)    Narrative:     The following orders were created for panel order Troponin I Series, High Sensitivity (0, 1 HR).  Procedure                               Abnormality         Status                     ---------                               -----------         ------                     Troponin I, High Sensiti...[[id number]]  Normal              Final result               Troponin, High Sensitivi...[[id number]]  Normal              Final result                 Please view results for these tests on the individual orders.   MICROSCOPIC ONLY, URINE    WBC, Urine 1-5      RBC, Urine 1-2      Mucus, Urine FEW     URINALYSIS WITH REFLEX CULTURE AND MICROSCOPIC    Narrative:     The following orders were created for panel order Urinalysis with Reflex Culture and Microscopic.  Procedure                               Abnormality         Status                     ---------                               -----------         ------                      Urinalysis with Reflex C...[[id number]]  Abnormal            Final result               Extra Urine Gray Tube[[id number]]                            In process                   Please view results for these tests on the individual orders.   EXTRA URINE GRAY TUBE      Procedure  Procedures     Madai Joseph PA-C  08/18/24 2116

## 2024-08-18 NOTE — ED TRIAGE NOTES
Pt arrives to [facility] via EMS, presenting with generalized upper abdominal pain that began approx 20 minutes PTA to ED. Pt states he became severely nauseated and diaphoretic, puddles of sweat on floor. No emesis noted. Pt also reports mild SOB, 97% on RA for EMS. Pt A&Ox4, resp appear eased and unlabored, skin diaphoretic. Pt states his pain is 10/10, describing the pain as sharp. EMS reports no meds given PTA to ED.

## 2024-08-19 LAB — HOLD SPECIMEN: NORMAL

## 2024-08-19 NOTE — DISCHARGE INSTRUCTIONS
If your symptoms get out of control is a apparently did earlier today, do not hesitate to return for reassessment and further treatment if necessary.

## 2024-08-19 NOTE — PROGRESS NOTES
Emergency Medicine Transition of Care Note.    I received Chato Reilly in signout from [facility] please see the previous ED provider note for all HPI, PE and MDM up to the time of signout at 2100. This is in addition to the primary record.    In brief Chato Reilly is an 68 y.o. male presenting for   Chief Complaint   Patient presents with    Abdominal Pain     Pt arrives to [facility] via EMS, presenting with generalized upper abdominal pain that began approx 20 minutes PTA to ED. Pt states he became severely nauseated and diaphoretic, puddles of sweat on floor. No emesis noted. Pt also reports mild SOB, 97% on RA for EMS. Pt A&Ox4, resp appear eased and unlabored, skin diaphoretic. Pt states his pain is 10/10, describing the pain as sharp. EMS reports no meds given PTA to ED.      At the time of signout we were awaiting: CAT scan report    ED Course as of 08/18/24 2219   Sun Aug 18, 2024   1830 ECG 12 lead  Sinus bradycardia.  Rate = 59.  Normal intervals.  No acute injury pattern. [BT]      ED Course User Index  [BT] Seth Ruiz,          Diagnoses as of 08/18/24 2219   Pain of upper abdomen   Generalized abdominal pain   Mesenteric panniculitis (Multi)       Medical Decision Making  I received the patient handoff from my midlevel Madaikevin Joseph, at 9:00 PM.  Patient has been in stable condition and remained so.  All of his lab work we reviewed and showed no abnormalities.  His CTs did show mesenteric panniculitis with scattered lymphadenopathy consistent with the same.  There was no sign of any surgical emergency.  Noted were the slight increases in size of his 2 intrarenal aortic aneurysms but without any sign of leakage.  We started him on nonsteroidals and antiemetics and then struck him regarding the pathophysiology of the condition he is having currently and had him follow-up with his primary care physician if he still not feeling well next week and to return to our ED immediately if his  symptoms get out of control as they did earlier today.        Final diagnoses:   [R10.10] Pain of upper abdomen   [R10.84] Generalized abdominal pain   [K65.4] Mesenteric panniculitis (Multi)           Procedure  Procedures    Matheus Manuel MD

## 2024-08-20 LAB — BACTERIA UR CULT: NORMAL

## 2024-08-23 LAB
ATRIAL RATE: 59 BPM
P AXIS: 60 DEGREES
P OFFSET: 205 MS
P ONSET: 147 MS
PR INTERVAL: 146 MS
Q ONSET: 220 MS
QRS COUNT: 10 BEATS
QRS DURATION: 82 MS
QT INTERVAL: 438 MS
QTC CALCULATION(BAZETT): 433 MS
QTC FREDERICIA: 435 MS
R AXIS: 53 DEGREES
T AXIS: 46 DEGREES
T OFFSET: 439 MS
VENTRICULAR RATE: 59 BPM

## 2024-09-15 NOTE — PROGRESS NOTES
"Subjective     HPI   Chato Reilly is a 69 y.o. year old male patient with presenting to clinic with concern for   Chief Complaint   Patient presents with    ER Follow-up     Severe abdominal pain, sweating, lasted 5 hours  8/18/24-  resolved and no problems since    [place] on 2/23/24 x 20 days-  just got back 9/13/24         Severe abd pain with Nausea and sensation of needing to have BM.     ED follow up  - refer to cardiol Dr Jimenez for aortic aneurysm management. <5cm but enlarging. Mesenteric panniculitis  - [facility]    Upper abdominal pain  Had epigastric pain with nausea and diaphoresis 20 min PTA at ER 8/18    DX mesenteric panniculitis- chronic, unchanged- not yet sclerosing    Neg trop. Lactate was 2.4      CT- Increased size of infrarenal abdominal aortic aneurysms. The  superior aneurysm measures 3 cm x 2.8 cm. The inferior aneurysm  measures 4.1 cm x 3.7 cm. Previously, these measured 2.8 cm x 2.4 cm  and 3.7 cm x 3.1 cm, respectively. (2021)    4mm lung cyst R upper lobe, unchanged since 2009, benign  Spleen hemangioma  Liver cyst, unchanged    Lumbar-   Multilevel spondylotic changes of the lumbar spine most advanced at L4-5 and  L5-S1 with moderate L4-5 facet arthropathy, grade 1 anterolisthesis  of L4 on L5 and broad-based disc bulge. There is moderate-severe  L5-S1 disc height loss. Chronic deformity of the right iliac body  with fusion of the SI joint.    June- had [facility] labs drawn incl TSH PSA and Lipids, will drop off records.- still waiting for these will request  A1c 1 year ago was 5.6%  Request colonoscopy records- mentor - [place] end[facility]  LDCT ordered December 2023, not done yet.      Calcium supplements, no vitamin B supplements.      Pain Mgt- Tyler Christine CNP [facility] [place], on tramadol. Hx lumbar spondylosis. Sees chiropractor also.      Urology- finasteride, catheter  Urologist? At [facility] \"Dr Woodard\" PSA is <1 per pt.        BP Readings from Last 7 " Encounters:   24 138/78   24 152/78   24 150/82   23 124/66   23 132/74         Patient Active Problem List   Diagnosis    Stented coronary artery    Low back pain without sciatica    Former smoker    BPH (benign prostatic hyperplasia)       Past Medical History:   Diagnosis Date    HLD (hyperlipidemia)     resolved. On Lipitor to prevent MI    HTN (hypertension)     MI (myocardial infarction) (Multi) 2021    NSTEMI, stent placed      Past Surgical History:   Procedure Laterality Date    CORONARY STENT PLACEMENT      MR HEAD ANGIO WO IV CONTRAST  2023    MR HEAD ANGIO WO IV CONTRAST 2023 GEN MRI    MR NECK ANGIO WO IV CONTRAST  2023    MR NECK ANGIO WO IV CONTRAST 2023 GEN MRI      Family History   Problem Relation Name Age of Onset    No Known Problems Mother      Heart attack Father  36        Multiple heart attacks    Heart disease Father          CABG x 5      Social History     Tobacco Use    Smoking status: Former     Current packs/day: 0.00     Average packs/day: 1 pack/day for 46.0 years (46.0 ttl pk-yrs)     Types: Cigarettes     Start date:      Quit date:      Years since quittin.7    Smokeless tobacco: Former     Types: Chew    Tobacco comments:     Stopped smoking when he got his cardiac stent   Substance Use Topics    Alcohol use: Never        Current Outpatient Medications:     aspirin 81 mg EC tablet, Take 1 tablet (81 mg) by mouth once daily., Disp: , Rfl:     FINASTERIDE ORAL, Take by mouth., Disp: , Rfl:     traMADol (Ultram) 50 mg tablet, Take 1 tablet (50 mg) by mouth 2 times a day as needed., Disp: , Rfl:     atorvastatin (Lipitor) 20 mg tablet, Take 1 tablet (20 mg) by mouth once daily., Disp: , Rfl:     ondansetron ODT (Zofran-ODT) 4 mg disintegrating tablet, Take 1 tablet (4 mg) by mouth every 8 hours if needed for nausea or vomiting. (Patient not taking: Reported on 2024), Disp: 30 tablet, Rfl: 0    tamsulosin (Flomax)  0.4 mg 24 hr capsule, , Disp: , Rfl:      Review of Systems  Constitutional: Denies fever  HEENT: Denies ST, earache  CVS: Denies Chest pain  Pulmonary: Denies wheezing, SOB  GI: Denies N/V  : Denies dysuria  Musculoskeletal:  Denies myalgia  Neuro: Denies focal weakness or numbness.  Skin: Denies Rashes.  *Review of Systems is negative unless otherwise mentioned in HPI or ROS above.    Objective   /78   Pulse 65   Temp 36.9 °C (98.4 °F)   Wt 71.9 kg (158 lb 9.6 oz)   SpO2 98%   BMI 24.12 kg/m²  reviewed Body mass index is 24.12 kg/m².     Physical Exam  Constitutional: NAD.  Resting comfortably.  Head: Atraumatic, normocephalic.  ENT: Moist oral mucosa. Nasal mucosa wnl.   Cardiac: Regular rate & rhythm.   Pulmonary: Lungs clear bilat  GI: Soft, Nontender, nondistended.   Musculoskeletal: No peripheral edema.   Skin: No evidence of trauma. No rashes  Psych: Intact judgement and insight.    .Assessment/Plan   Problem List Items Addressed This Visit    None  Visit Diagnoses         Codes    Abdominal aortic aneurysm (AAA) greater than 39 mm in diameter (CMS-[facility])    -  Primary I71.40    Relevant Medications    losartan (Cozaar) 25 mg tablet    Other Relevant Orders    Referral to Vascular Medicine    Essential hypertension     I10    Relevant Medications    losartan (Cozaar) 25 mg tablet    Mesenteric panniculitis (Multi)     K65.4    Relevant Orders    Referral to Gastroenterology

## 2024-09-16 ENCOUNTER — APPOINTMENT (OUTPATIENT)
Dept: PRIMARY CARE | Facility: CLINIC | Age: 69
End: 2024-09-16
Payer: OTHER GOVERNMENT

## 2024-09-16 VITALS
OXYGEN SATURATION: 98 % | DIASTOLIC BLOOD PRESSURE: 78 MMHG | WEIGHT: 158.6 LBS | HEART RATE: 65 BPM | BODY MASS INDEX: 24.12 KG/M2 | SYSTOLIC BLOOD PRESSURE: 138 MMHG | TEMPERATURE: 98.4 F

## 2024-09-16 DIAGNOSIS — I71.40: Primary | ICD-10-CM

## 2024-09-16 DIAGNOSIS — I10 ESSENTIAL HYPERTENSION: ICD-10-CM

## 2024-09-16 DIAGNOSIS — K65.4 MESENTERIC PANNICULITIS (MULTI): ICD-10-CM

## 2024-09-16 PROCEDURE — 1159F MED LIST DOCD IN RCRD: CPT | Performed by: PHYSICIAN ASSISTANT

## 2024-09-16 PROCEDURE — 3075F SYST BP GE 130 - 139MM HG: CPT | Performed by: PHYSICIAN ASSISTANT

## 2024-09-16 PROCEDURE — 1160F RVW MEDS BY RX/DR IN RCRD: CPT | Performed by: PHYSICIAN ASSISTANT

## 2024-09-16 PROCEDURE — 99214 OFFICE O/P EST MOD 30 MIN: CPT | Performed by: PHYSICIAN ASSISTANT

## 2024-09-16 PROCEDURE — 3078F DIAST BP <80 MM HG: CPT | Performed by: PHYSICIAN ASSISTANT

## 2024-09-16 PROCEDURE — 1036F TOBACCO NON-USER: CPT | Performed by: PHYSICIAN ASSISTANT

## 2024-09-16 PROCEDURE — 1124F ACP DISCUSS-NO DSCNMKR DOCD: CPT | Performed by: PHYSICIAN ASSISTANT

## 2024-09-16 RX ORDER — LOSARTAN POTASSIUM 25 MG/1
25 TABLET ORAL DAILY
Qty: 30 TABLET | Refills: 0 | Status: SHIPPED | OUTPATIENT
Start: 2024-09-16 | End: 2024-10-16

## 2024-09-16 NOTE — PATIENT INSTRUCTIONS
Need to start monitoring lower aortic aneursym. Now noted to be 4.1cm. Nonsurgical at this point. But needs monitoring. I will refer you to cardiology  Superior aneurysm measured 2.8 cm x 2.4 cm in 2021 and is now 3 cm x 2.8 cm in 2024.  Inferior aneurysm was 3.7 cm x 3.1 cm in 2021 and now measures 4.1 cm x 3.7 cm.

## 2024-10-02 ENCOUNTER — APPOINTMENT (OUTPATIENT)
Dept: PRIMARY CARE | Facility: CLINIC | Age: 69
End: 2024-10-02
Payer: OTHER GOVERNMENT

## 2024-11-12 ENCOUNTER — TELEPHONE (OUTPATIENT)
Dept: PRIMARY CARE | Facility: CLINIC | Age: 69
End: 2024-11-12
Payer: OTHER GOVERNMENT

## 2024-11-12 NOTE — TELEPHONE ENCOUNTER
Patient walked in today and stated that he has been having right and left shoulder pain. Left shoulder can be worse than the right. It has been ongoing for awhile and can't take the pain anymore. You don't have anything open for the next couple weeks. Would you be okay with doing a virtual or squeeze him in somewhere?

## 2024-11-18 ENCOUNTER — APPOINTMENT (OUTPATIENT)
Dept: PRIMARY CARE | Facility: CLINIC | Age: 69
End: 2024-11-18
Payer: MEDICARE

## 2024-11-18 ENCOUNTER — HOSPITAL ENCOUNTER (OUTPATIENT)
Dept: RADIOLOGY | Facility: HOSPITAL | Age: 69
Discharge: HOME | End: 2024-11-18
Payer: MEDICARE

## 2024-11-18 VITALS
BODY MASS INDEX: 25.1 KG/M2 | WEIGHT: 165.6 LBS | OXYGEN SATURATION: 97 % | DIASTOLIC BLOOD PRESSURE: 72 MMHG | SYSTOLIC BLOOD PRESSURE: 136 MMHG | TEMPERATURE: 97.8 F | HEART RATE: 67 BPM | HEIGHT: 68 IN

## 2024-11-18 DIAGNOSIS — M25.512 ACUTE PAIN OF LEFT SHOULDER: Primary | ICD-10-CM

## 2024-11-18 DIAGNOSIS — M25.512 ACUTE PAIN OF LEFT SHOULDER: ICD-10-CM

## 2024-11-18 DIAGNOSIS — I71.40: ICD-10-CM

## 2024-11-18 DIAGNOSIS — I10 ESSENTIAL HYPERTENSION: ICD-10-CM

## 2024-11-18 PROCEDURE — 3008F BODY MASS INDEX DOCD: CPT | Performed by: PHYSICIAN ASSISTANT

## 2024-11-18 PROCEDURE — 1036F TOBACCO NON-USER: CPT | Performed by: PHYSICIAN ASSISTANT

## 2024-11-18 PROCEDURE — 99214 OFFICE O/P EST MOD 30 MIN: CPT | Performed by: PHYSICIAN ASSISTANT

## 2024-11-18 PROCEDURE — 3075F SYST BP GE 130 - 139MM HG: CPT | Performed by: PHYSICIAN ASSISTANT

## 2024-11-18 PROCEDURE — 73030 X-RAY EXAM OF SHOULDER: CPT | Mod: LEFT SIDE | Performed by: RADIOLOGY

## 2024-11-18 PROCEDURE — 73030 X-RAY EXAM OF SHOULDER: CPT | Mod: LT

## 2024-11-18 PROCEDURE — 3078F DIAST BP <80 MM HG: CPT | Performed by: PHYSICIAN ASSISTANT

## 2024-11-18 PROCEDURE — 1159F MED LIST DOCD IN RCRD: CPT | Performed by: PHYSICIAN ASSISTANT

## 2024-11-18 RX ORDER — PREDNISONE 20 MG/1
40 TABLET ORAL DAILY
Qty: 10 TABLET | Refills: 0 | Status: SHIPPED | OUTPATIENT
Start: 2024-11-18 | End: 2024-11-23

## 2024-11-18 RX ORDER — LOSARTAN POTASSIUM 25 MG/1
25 TABLET ORAL DAILY
Qty: 90 TABLET | Refills: 3 | Status: SHIPPED | OUTPATIENT
Start: 2024-11-18 | End: 2025-11-18

## 2024-11-18 NOTE — PROGRESS NOTES
Subjective     HPI   Chato Reilly is a 69 y.o. year old male patient with presenting to clinic with concern for   Chief Complaint   Patient presents with    Shoulder Pain     Bilateral x 3 months        Left shoulder pain worsening over 3 months.  At one point he thought it was his pillow. Changed pillows and then it was both shoulders. Left shoulder  continues to cause pain. Limited ROM. NKI.   Using heat and ice on shoulders.   Painful to lay on. Limiting sleep overnight despite sleeping position.  Sleeps ~3 hr increments.     Tramadol for low back pain. NO additional tylenol or motrin.     Patient Active Problem List   Diagnosis    Stented coronary artery    Low back pain without sciatica    Former smoker    BPH (benign prostatic hyperplasia)       Past Medical History:   Diagnosis Date    HLD (hyperlipidemia)     resolved. On Lipitor to prevent MI    HTN (hypertension)     MI (myocardial infarction) (Multi) 2021    NSTEMI, stent placed      Past Surgical History:   Procedure Laterality Date    CORONARY STENT PLACEMENT      MR HEAD ANGIO WO IV CONTRAST  2023    MR HEAD ANGIO WO IV CONTRAST 2023 GEN MRI    MR NECK ANGIO WO IV CONTRAST  2023    MR NECK ANGIO WO IV CONTRAST 2023 GEN MRI      Family History   Problem Relation Name Age of Onset    No Known Problems Mother      Heart attack Father  36        Multiple heart attacks    Heart disease Father          CABG x 5      Social History     Tobacco Use    Smoking status: Former     Current packs/day: 0.00     Average packs/day: 1 pack/day for 46.0 years (46.0 ttl pk-yrs)     Types: Cigarettes     Start date:      Quit date: 2016     Years since quittin.8    Smokeless tobacco: Former     Types: Chew    Tobacco comments:     Stopped smoking when he got his cardiac stent   Substance Use Topics    Alcohol use: Never        Current Outpatient Medications:     aspirin 81 mg EC tablet, Take 1 tablet (81 mg) by mouth once daily., Disp:  ", Rfl:     atorvastatin (Lipitor) 20 mg tablet, Take 1 tablet (20 mg) by mouth once daily., Disp: , Rfl:     FINASTERIDE ORAL, Take by mouth., Disp: , Rfl:     tamsulosin (Flomax) 0.4 mg 24 hr capsule, , Disp: , Rfl:     traMADol (Ultram) 50 mg tablet, Take 1 tablet (50 mg) by mouth 2 times a day as needed., Disp: , Rfl:     losartan (Cozaar) 25 mg tablet, Take 1 tablet (25 mg) by mouth once daily., Disp: 90 tablet, Rfl: 3    ondansetron ODT (Zofran-ODT) 4 mg disintegrating tablet, Take 1 tablet (4 mg) by mouth every 8 hours if needed for nausea or vomiting. (Patient not taking: Reported on 11/18/2024), Disp: 30 tablet, Rfl: 0     Review of Systems  Constitutional: Denies fever  HEENT: Denies ST, earache  CVS: Denies Chest pain  Pulmonary: Denies wheezing, SOB  GI: Denies N/V  : Denies dysuria  Musculoskeletal:  Denies myalgia  Neuro: Denies focal weakness or numbness.  Skin: Denies Rashes.  *Review of Systems is negative unless otherwise mentioned in HPI or ROS above.    Objective   /72   Pulse 67   Temp 36.6 °C (97.8 °F)   Ht 1.727 m (5' 8\")   Wt 75.1 kg (165 lb 9.6 oz)   SpO2 97%   BMI 25.18 kg/m²  reviewed Body mass index is 25.18 kg/m².     Physical Exam  Constitutional: NAD.  Resting comfortably.  Head: Atraumatic, normocephalic.  ENT: Moist oral mucosa. Nasal mucosa wnl.   Cardiac: Regular rate & rhythm.   Pulmonary: Lungs clear bilat  GI: Soft, Nontender, nondistended.   Musculoskeletal: No peripheral edema.   Skin: No evidence of trauma. No rashes  Psych: Intact judgement and insight.    .Assessment/Plan   Problem List Items Addressed This Visit    None  Visit Diagnoses         Codes    Acute pain of right shoulder    -  Primary M25.511    Relevant Medications    predniSONE (Deltasone) 20 mg tablet    Other Relevant Orders    XR shoulder left 2+ views    Referral to Orthopaedic Surgery    Abdominal aortic aneurysm (AAA) greater than 39 mm in diameter (CMS-[facility])     I71.40    Relevant " Medications    losartan (Cozaar) 25 mg tablet    Essential hypertension     I10    Relevant Medications    losartan (Cozaar) 25 mg tablet

## 2024-11-22 NOTE — PROGRESS NOTES
History Of Present Illness  Chato Reilly is a 69 y.o. male presenting to GI clinic with a chief complaint of mesenteric panniculitis.    Patient states he was seen in seen in ED 2 months ago for nausea and epigastric abdominal pain. He was diagnosed with mesenteric panniculitis.  Symptoms resolved; the next day he felt fine and has felt great since.  He states he currently has no symptoms.  He denies nausea, abdominal pain, changes in bowel habits, BRBPR, melena, hematochezia, constipation, diarrhea.  He does state that it has been at least 10 years since his last colonoscopy.    He has a history of GERD which is well-controlled if he lays on his left side and is not on medication for.  Social History  He reports that he quit smoking about 8 years ago. His smoking use included cigarettes. He started smoking about 54 years ago. He has a 46 pack-year smoking history. He has quit using smokeless tobacco.  His smokeless tobacco use included chew. He reports that he does not drink alcohol and does not use drugs.  He does not take NSAIDs on a regular basis  He is very active, plans on completing another 200 mile leg of [employer] in January.  Family History  Family History   Problem Relation Name Age of Onset    No Known Problems Mother      Heart attack Father  36        Multiple heart attacks    Heart disease Father          CABG x 5    Prostate cancer Father      Esophageal cancer Sister      Stomach cancer Sister       The patient does not have a FH of CRC. he does not have a FH of IBD    Review of Systems   Gastrointestinal:         See HPI   All other systems reviewed and are negative.        Physical Exam  Constitutional:       General: He is not in acute distress.     Appearance: Normal appearance. He is not ill-appearing.   HENT:      Head: Normocephalic and atraumatic.      Mouth/Throat:      Mouth: Mucous membranes are moist.   Eyes:      General: No scleral icterus.     Conjunctiva/sclera:  "Conjunctivae normal.      Pupils: Pupils are equal, round, and reactive to light.   Pulmonary:      Effort: Pulmonary effort is normal.   Abdominal:      General: Bowel sounds are normal. There is no distension.      Palpations: Abdomen is soft. There is no mass.      Tenderness: There is no abdominal tenderness.      Hernia: No hernia is present.   Musculoskeletal:         General: Normal range of motion.      Cervical back: Normal range of motion.   Skin:     General: Skin is warm and dry.      Coloration: Skin is not jaundiced or pale.   Neurological:      Mental Status: He is alert. Mental status is at baseline.   Psychiatric:         Mood and Affect: Mood normal.         Behavior: Behavior normal.          Last Vital Signs  /69 (BP Location: Left arm, Patient Position: Sitting, BP Cuff Size: Adult)   Pulse 67   Resp 18   Ht 1.727 m (5' 8\")   Wt 76.7 kg (169 lb)   SpO2 99%   BMI 25.70 kg/m²      Relevant Results  Lab Results   Component Value Date    WBC 10.7 08/18/2024    HGB 14.2 08/18/2024    HCT 42.0 08/18/2024    MCV 87 08/18/2024     08/18/2024      Lab Results   Component Value Date    GLUCOSE 109 (H) 08/18/2024    CALCIUM 9.5 08/18/2024     08/18/2024    K 3.6 08/18/2024    CO2 22 08/18/2024     08/18/2024    BUN 29 (H) 08/18/2024    CREATININE 1.02 08/18/2024      Lab Results   Component Value Date    ALT 21 08/18/2024    AST 16 08/18/2024    ALKPHOS 45 08/18/2024    BILITOT 0.5 08/18/2024    INR 1.2 (H) 03/03/2021    No results found for: \"IRON\", \"TIBC\", \"IRONSAT\", \"FERRITIN\", \"STTRQMOK46\", \"FOLATE\"  No results found for: \"CALPS\", \"CRP\"   Lab Results   Component Value Date    LIPASE 3 (L) 08/18/2024    LIPASE <3 (A) 03/03/2021      Lab Results   Component Value Date    HGBA1C 5.6 05/13/2023        EGD/COLONOSCOPY/COLOGUARD  EGD-within last 15 years       Colonoscopy-over 10 years ago at the endoscopy center in [place], no polyps per patient       PERTINENT IMAGING  === " 08/18/24 ===  CT ANGIO CHEST ABDOMEN PELVIS  - Impression -  1. No acute thoracoabdominal aortic pathology.    2. Increased size of infrarenal abdominal aortic aneurysms. The superior aneurysm measures 3 cm x 2.8 cm. The inferior aneurysm measures 4.1 cm x 3.7 cm. Previously, these measured 2.8 cm x 2.4 cm and 3.7 cm x 3.1 cm, respectively.    3. Emphysema without acute pulmonary process. Continued lung cancer screening low-dose CT recommended annually.    4. No acute process in the abdomen or pelvis.    5. Redemonstration of mesenteric panniculitis, unchanged from prior study.        Assessment/Plan    Assessment & Plan  Mesenteric panniculitis (Multi)  Asymptomatic, no indication for steroids at this time  Orders:    Referral to Gastroenterology    Colon cancer screening  Overdue for screening colonoscopy, ordered  Orders:    Colonoscopy Screening; Average Risk Patient; Future    Follow-up as needed      Kamala Osullivan, MILLY-CNP  11/26/24    All other review of systems negative, except as noted in HPI

## 2024-11-26 ENCOUNTER — APPOINTMENT (OUTPATIENT)
Dept: GASTROENTEROLOGY | Facility: CLINIC | Age: 69
End: 2024-11-26
Payer: OTHER GOVERNMENT

## 2024-11-26 VITALS
OXYGEN SATURATION: 99 % | BODY MASS INDEX: 25.61 KG/M2 | DIASTOLIC BLOOD PRESSURE: 69 MMHG | RESPIRATION RATE: 18 BRPM | WEIGHT: 169 LBS | HEIGHT: 68 IN | HEART RATE: 67 BPM | SYSTOLIC BLOOD PRESSURE: 120 MMHG

## 2024-11-26 DIAGNOSIS — Z12.11 COLON CANCER SCREENING: Primary | ICD-10-CM

## 2024-11-26 DIAGNOSIS — K65.4 MESENTERIC PANNICULITIS (MULTI): ICD-10-CM

## 2024-11-26 PROCEDURE — 99203 OFFICE O/P NEW LOW 30 MIN: CPT

## 2024-11-26 PROCEDURE — 1159F MED LIST DOCD IN RCRD: CPT

## 2024-11-26 PROCEDURE — 3008F BODY MASS INDEX DOCD: CPT

## 2024-11-26 PROCEDURE — 1036F TOBACCO NON-USER: CPT

## 2024-11-26 PROCEDURE — 1126F AMNT PAIN NOTED NONE PRSNT: CPT

## 2024-11-26 PROCEDURE — 1160F RVW MEDS BY RX/DR IN RCRD: CPT

## 2024-11-26 RX ORDER — MECLIZINE HYDROCHLORIDE 25 MG/1
25 TABLET ORAL 3 TIMES DAILY PRN
COMMUNITY

## 2024-11-26 RX ORDER — POLYETHYLENE GLYCOL 3350, SODIUM SULFATE ANHYDROUS, SODIUM BICARBONATE, SODIUM CHLORIDE, POTASSIUM CHLORIDE 236; 22.74; 6.74; 5.86; 2.97 G/4L; G/4L; G/4L; G/4L; G/4L
POWDER, FOR SOLUTION ORAL
Qty: 4000 ML | Refills: 0 | Status: SHIPPED | OUTPATIENT
Start: 2024-11-26

## 2024-11-26 ASSESSMENT — PATIENT HEALTH QUESTIONNAIRE - PHQ9
2. FEELING DOWN, DEPRESSED OR HOPELESS: NOT AT ALL
SUM OF ALL RESPONSES TO PHQ9 QUESTIONS 1 AND 2: 0
1. LITTLE INTEREST OR PLEASURE IN DOING THINGS: NOT AT ALL

## 2024-11-26 ASSESSMENT — ENCOUNTER SYMPTOMS: ROS GI COMMENTS: SEE HPI

## 2024-11-26 ASSESSMENT — PAIN SCALES - GENERAL: PAINLEVEL_OUTOF10: 0-NO PAIN

## 2024-12-05 ENCOUNTER — APPOINTMENT (OUTPATIENT)
Dept: PREADMISSION TESTING | Facility: HOSPITAL | Age: 69
End: 2024-12-05
Payer: MEDICARE

## 2024-12-16 ENCOUNTER — APPOINTMENT (OUTPATIENT)
Dept: GASTROENTEROLOGY | Facility: HOSPITAL | Age: 69
End: 2024-12-16
Payer: MEDICARE

## 2025-01-06 ENCOUNTER — ANESTHESIA EVENT (OUTPATIENT)
Dept: GASTROENTEROLOGY | Facility: HOSPITAL | Age: 70
End: 2025-01-06

## 2025-01-06 PROBLEM — I10 HTN (HYPERTENSION): Status: ACTIVE | Noted: 2025-01-06

## 2025-01-06 PROBLEM — I25.10 CAD (CORONARY ARTERY DISEASE): Status: ACTIVE | Noted: 2025-01-06

## 2025-01-06 PROBLEM — E78.5 HYPERLIPIDEMIA: Status: ACTIVE | Noted: 2025-01-06

## 2025-01-06 PROBLEM — K21.9 GASTROESOPHAGEAL REFLUX DISEASE: Status: ACTIVE | Noted: 2025-01-06

## 2025-01-06 PROBLEM — I21.9 MI (MYOCARDIAL INFARCTION) (MULTI): Status: ACTIVE | Noted: 2025-01-06

## 2025-01-06 PROBLEM — I71.9 AORTIC ANEURYSM (CMS-HCC): Status: ACTIVE | Noted: 2025-01-06

## 2025-01-06 SDOH — HEALTH STABILITY: MENTAL HEALTH: CURRENT SMOKER: 0

## 2025-01-06 NOTE — ANESTHESIA PREPROCEDURE EVALUATION
Patient: Chato Reilly    Procedure Information       Date/Time: 01/16/25 0840    Scheduled providers: Francisco Wiggins MD    Procedure: COLONOSCOPY    Location: [facility]            Relevant Problems   Anesthesia (within normal limits)      Cardiac   (+) Aortic aneurysm (CMS-HCC)   (+) CAD (coronary artery disease)   (+) HTN (hypertension)   (+) Hyperlipidemia   (+) MI (myocardial infarction) (Multi)   (+) Stented coronary artery      Pulmonary (within normal limits)      Neuro (within normal limits)      GI   (+) Gastroesophageal reflux disease      /Renal   (+) BPH (benign prostatic hyperplasia)      Liver (within normal limits)      Endocrine (within normal limits)      Hematology (within normal limits)      Musculoskeletal (within normal limits)      HEENT (within normal limits)      ID (within normal limits)      Skin (within normal limits)      GYN (within normal limits)     There were no vitals filed for this visit.    Past Surgical History:   Procedure Laterality Date    CORONARY STENT PLACEMENT      MR HEAD ANGIO WO IV CONTRAST  05/12/2023    MR HEAD ANGIO WO IV CONTRAST 5/12/2023 GEN MRI    MR NECK ANGIO WO IV CONTRAST  05/12/2023    MR NECK ANGIO WO IV CONTRAST 5/12/2023 GEN MRI     Past Medical History:   Diagnosis Date    HLD (hyperlipidemia)     resolved. On Lipitor to prevent MI    HTN (hypertension)     MI (myocardial infarction) (Multi) 12/2021    NSTEMI, stent placed       Current Outpatient Medications:     aspirin 81 mg EC tablet, Take 1 tablet (81 mg) by mouth once daily., Disp: , Rfl:     FINASTERIDE ORAL, Take by mouth., Disp: , Rfl:     losartan (Cozaar) 25 mg tablet, Take 1 tablet (25 mg) by mouth once daily., Disp: 90 tablet, Rfl: 3    meclizine (Antivert) 25 mg tablet, Take 1 tablet (25 mg) by mouth 3 times a day as needed for dizziness., Disp: , Rfl:     ondansetron ODT (Zofran-ODT) 4 mg disintegrating tablet, Take 1 tablet (4 mg) by mouth every 8 hours if needed for  nausea or vomiting. (Patient not taking: Reported on 11/26/2024), Disp: 30 tablet, Rfl: 0    polyethylene glycol (GoLYTELY) 236-22.74-6.74 -5.86 gram solution, At 5pm, the night before your colonoscopy, take 8 ounces every 15 minutes until half the solution is gone (8 glasses). Refrigerate and repeat with the other half 5 hours prior to your colonoscopy., Disp: 4000 mL, Rfl: 0    tamsulosin (Flomax) 0.4 mg 24 hr capsule, , Disp: , Rfl:     traMADol (Ultram) 50 mg tablet, Take 1 tablet (50 mg) by mouth 2 times a day as needed., Disp: , Rfl:   Prior to Admission medications    Medication Sig Start Date End Date Taking? Authorizing Provider   aspirin 81 mg EC tablet Take 1 tablet (81 mg) by mouth once daily. 4/24/23   Historical Provider, MD   FINASTERIDE ORAL Take by mouth.    Historical Provider, MD   losartan (Cozaar) 25 mg tablet Take 1 tablet (25 mg) by mouth once daily. 11/18/24 11/18/25  Jackie Magdaleno PA-C   meclizine (Antivert) 25 mg tablet Take 1 tablet (25 mg) by mouth 3 times a day as needed for dizziness.    Historical Provider, MD   ondansetron ODT (Zofran-ODT) 4 mg disintegrating tablet Take 1 tablet (4 mg) by mouth every 8 hours if needed for nausea or vomiting.  Patient not taking: Reported on 11/26/2024 8/18/24   Matheus Manuel MD   polyethylene glycol (GoLYTELY) 236-22.74-6.74 -5.86 gram solution At 5pm, the night before your colonoscopy, take 8 ounces every 15 minutes until half the solution is gone (8 glasses). Refrigerate and repeat with the other half 5 hours prior to your colonoscopy. 11/26/24   Kamala Osullivan APRN-CNP   tamsulosin (Flomax) 0.4 mg 24 hr capsule  10/26/22   Historical Provider, MD   traMADol (Ultram) 50 mg tablet Take 1 tablet (50 mg) by mouth 2 times a day as needed. 5/10/23   Historical Provider, MD     No Known Allergies  Social History     Tobacco Use    Smoking status: Former     Current packs/day: 0.00     Average packs/day: 1 pack/day for 46.0 years (46.0 ttl  pk-yrs)     Types: Cigarettes     Start date:      Quit date: 2016     Years since quittin.0    Smokeless tobacco: Former     Types: Chew    Tobacco comments:     Stopped smoking when he got his cardiac stent   Substance Use Topics    Alcohol use: Never         Chemistry    Lab Results   Component Value Date/Time     2024 1830    K 3.6 2024 1830     2024 1830    CO2 22 2024 1830    BUN 29 (H) 2024 1830    CREATININE 1.02 2024 1830    Lab Results   Component Value Date/Time    CALCIUM 9.5 2024 1830    ALKPHOS 45 2024 1830    AST 16 2024 1830    ALT 21 2024 1830    BILITOT 0.5 2024 1830          Lab Results   Component Value Date/Time    WBC 10.7 2024 1830    HGB 14.2 2024 1830    HCT 42.0 2024 1830     2024 1830     Lab Results   Component Value Date/Time    PROTIME 13.6 (H) 2021 1735    INR 1.2 (H) 2021 1735     Encounter Date: 24   ECG 12 lead   Result Value    Ventricular Rate 59    Atrial Rate 59    OK Interval 146    QRS Duration 82    QT Interval 438    QTC Calculation(Bazett) 433    P Axis 60    R Axis 53    T Axis 46    QRS Count 10    Q Onset 220    P Onset 147    P Offset 205    T Offset 439    QTC Fredericia 435    Narrative    Sinus bradycardia  Possible Left atrial enlargement  Borderline ECG  When compared with ECG of 12-MAY-2023 13:18,  No significant change was found  See ED provider note for full interpretation and clinical correlation  Confirmed by Tatum Hagen ([id number]) on 2024 7:58:50 PM     No results found for this or any previous visit from the past 1095 days.    Clinical information reviewed:                 Chart reviewed.  Cardiac clearance requested.  Previous echo/last cardiology note requested.      NPO Detail:  No data recorded     PHYSICAL EXAM    Anesthesia Plan    History of general anesthesia?: yes  History of complications of general anesthesia?:  unknown/emergency    ASA 3     MAC     The patient is not a current smoker.    intravenous induction   Anesthetic plan and risks discussed with patient.

## 2025-01-07 ENCOUNTER — HOSPITAL ENCOUNTER (EMERGENCY)
Facility: HOSPITAL | Age: 70
Discharge: HOME | End: 2025-01-07
Payer: MEDICARE

## 2025-01-07 VITALS
TEMPERATURE: 97.8 F | HEIGHT: 68 IN | RESPIRATION RATE: 16 BRPM | HEART RATE: 76 BPM | OXYGEN SATURATION: 100 % | SYSTOLIC BLOOD PRESSURE: 148 MMHG | BODY MASS INDEX: 26.52 KG/M2 | WEIGHT: 175 LBS | DIASTOLIC BLOOD PRESSURE: 96 MMHG

## 2025-01-07 DIAGNOSIS — S61.412A LACERATION OF LEFT HAND WITHOUT FOREIGN BODY, INITIAL ENCOUNTER: Primary | ICD-10-CM

## 2025-01-07 PROCEDURE — 2500000004 HC RX 250 GENERAL PHARMACY W/ HCPCS (ALT 636 FOR OP/ED): Performed by: PHYSICIAN ASSISTANT

## 2025-01-07 PROCEDURE — 12041 INTMD RPR N-HF/GENIT 2.5CM/<: CPT

## 2025-01-07 PROCEDURE — 12001 RPR S/N/AX/GEN/TRNK 2.5CM/<: CPT | Performed by: PHYSICIAN ASSISTANT

## 2025-01-07 PROCEDURE — 99283 EMERGENCY DEPT VISIT LOW MDM: CPT | Mod: 25

## 2025-01-07 PROCEDURE — 90715 TDAP VACCINE 7 YRS/> IM: CPT | Performed by: PHYSICIAN ASSISTANT

## 2025-01-07 PROCEDURE — 90471 IMMUNIZATION ADMIN: CPT | Performed by: PHYSICIAN ASSISTANT

## 2025-01-07 PROCEDURE — 99284 EMERGENCY DEPT VISIT MOD MDM: CPT

## 2025-01-07 RX ORDER — CEPHALEXIN 500 MG/1
500 CAPSULE ORAL 2 TIMES DAILY
Qty: 20 CAPSULE | Refills: 0 | Status: SHIPPED | OUTPATIENT
Start: 2025-01-07 | End: 2025-01-17

## 2025-01-07 RX ADMIN — TETANUS TOXOID, REDUCED DIPHTHERIA TOXOID AND ACELLULAR PERTUSSIS VACCINE, ADSORBED 0.5 ML: 5; 2.5; 8; 8; 2.5 SUSPENSION INTRAMUSCULAR at 15:00

## 2025-01-07 ASSESSMENT — COLUMBIA-SUICIDE SEVERITY RATING SCALE - C-SSRS
2. HAVE YOU ACTUALLY HAD ANY THOUGHTS OF KILLING YOURSELF?: NO
1. IN THE PAST MONTH, HAVE YOU WISHED YOU WERE DEAD OR WISHED YOU COULD GO TO SLEEP AND NOT WAKE UP?: NO
6. HAVE YOU EVER DONE ANYTHING, STARTED TO DO ANYTHING, OR PREPARED TO DO ANYTHING TO END YOUR LIFE?: NO

## 2025-01-07 ASSESSMENT — PAIN - FUNCTIONAL ASSESSMENT: PAIN_FUNCTIONAL_ASSESSMENT: 0-10

## 2025-01-07 ASSESSMENT — PAIN SCALES - GENERAL: PAINLEVEL_OUTOF10: 0 - NO PAIN

## 2025-01-07 NOTE — ED PROVIDER NOTES
"HPI   Chief Complaint   Patient presents with   • Laceration       69-year-old male here with a laceration to the dorsal left hand he was attempting to cut zip ties with his pocket knife when his zip tie came loose \"quick\" causing him to slip with his hand any accidentally stabbed himself to the dorsal aspect of the left hand between the first and second metacarpal area area      Denies any other injuries happened just prior to coming to the ED              Patient History   Past Medical History:   Diagnosis Date   • HLD (hyperlipidemia)     resolved. On Lipitor to prevent MI   • HTN (hypertension)    • MI (myocardial infarction) (Multi) 2021    NSTEMI, stent placed     Past Surgical History:   Procedure Laterality Date   • CORONARY STENT PLACEMENT     • MR HEAD ANGIO WO IV CONTRAST  2023    MR HEAD ANGIO WO IV CONTRAST 2023 GEN MRI   • MR NECK ANGIO WO IV CONTRAST  2023    MR NECK ANGIO WO IV CONTRAST 2023 GEN MRI     Family History   Problem Relation Name Age of Onset   • No Known Problems Mother     • Heart attack Father  36        Multiple heart attacks   • Heart disease Father          CABG x 5   • Prostate cancer Father     • Esophageal cancer Sister     • Stomach cancer Sister       Social History     Tobacco Use   • Smoking status: Former     Current packs/day: 0.00     Average packs/day: 1 pack/day for 46.0 years (46.0 ttl pk-yrs)     Types: Cigarettes     Start date:      Quit date: 2016     Years since quittin.0   • Smokeless tobacco: Former     Types: Chew   • Tobacco comments:     Stopped smoking when he got his cardiac stent   Vaping Use   • Vaping status: Never Used   Substance Use Topics   • Alcohol use: Never   • Drug use: Never       Physical Exam   ED Triage Vitals [25 1345]   Temperature Heart Rate Respirations BP   36.6 °C (97.8 °F) 76 16 (!) 148/96      Pulse Ox Temp Source Heart Rate Source Patient Position   100 % Temporal -- --      BP Location FiO2 (%) "     -- --       Physical Exam  Vitals and nursing note reviewed.   Constitutional:       General: He is not in acute distress.     Appearance: He is well-developed.   HENT:      Head: Normocephalic and atraumatic.      Nose: Nose normal.   Eyes:      Conjunctiva/sclera: Conjunctivae normal.   Cardiovascular:      Rate and Rhythm: Normal rate and regular rhythm.      Heart sounds: No murmur heard.  Pulmonary:      Effort: Pulmonary effort is normal. No respiratory distress.      Breath sounds: Normal breath sounds.   Abdominal:      Palpations: Abdomen is soft.      Tenderness: There is no abdominal tenderness.   Musculoskeletal:         General: No swelling.      Cervical back: Neck supple.      Comments:  5/5 equal bilateral upper extremities  Full range of motion against resistance with flexion and extension to the first second digits on the left hand     Skin:     General: Skin is warm and dry.      Capillary Refill: Capillary refill takes less than 2 seconds.      Comments: 2.5 linear laceration to the dorsal aspect of the left hand between the first and second metacarpal area    Neurological:      General: No focal deficit present.      Mental Status: He is alert and oriented to person, place, and time.   Psychiatric:         Mood and Affect: Mood normal.           ED Course & MDM   Diagnoses as of 01/07/25 1446   Laceration of left hand without foreign body, initial encounter                 No data recorded     Shanna Coma Scale Score: 15 (01/07/25 1347 : Ade Steve RN)                           Medical Decision Making  Differential:   1) left hand laceration    69-year-old male here with complaints of accidentally cutting his left hand he had been cutting zip ties when he accidentally caused a puncture wound to his left hand when one of the ties came loose quickly happened just prior to coming to the ED, last tetanus unknown no bony pain with palp, 3 sutures placed after irrigation antibiotic  ointment nonstick dressing will place patient on prophylactic antibiotic     Plan: Discussed differential with the patient and /or parents family   Patient to  follow up with the PCP in the next 2-3 days  Return for any worsening symptoms or go to the ER for further evaluation. Patient/family/caregiver  understands return   precautions and discharge instructions and is agreeable to the current plan   Impression: Left hand laceration          Orders and Diagnoses for this visit             Procedure  Laceration Repair    Performed by: Umm López PA-C  Authorized by: Umm López PA-C    Consent:     Consent obtained:  Verbal    Consent given by:  Patient    Risks, benefits, and alternatives were discussed: yes      Risks discussed:  Infection, pain, need for additional repair and nerve damage    Alternatives discussed:  No treatment  Universal protocol:     Patient identity confirmed:  Verbally with patient  Anesthesia:     Anesthesia method:  Local infiltration    Local anesthetic:  Lidocaine 1% WITH epi  Laceration details:     Location:  Hand    Hand location:  L wrist    Length (cm):  2.5    Depth (mm):  5  Pre-procedure details:     Preparation:  Patient was prepped and draped in usual sterile fashion  Exploration:     Limited defect created (wound extended): no      Imaging outcome: foreign body not noted      Wound exploration: wound explored through full range of motion      Contaminated: yes    Treatment:     Area cleansed with:  Povidone-iodine    Amount of cleaning:  Extensive    Irrigation solution:  Sterile saline    Irrigation volume:  50    Irrigation method:  Syringe    Debridement:  None    Undermining:  None    Scar revision: no    Skin repair:     Repair method:  Sutures    Suture size:  3-0    Suture material:  Nylon    Suture technique:  Simple interrupted    Number of sutures:  3  Approximation:     Approximation:  Close  Repair type:     Repair type:  Simple  Post-procedure details:      Dressing:  Antibiotic ointment    Procedure completion:  Tolerated       Umm López PA-C  01/07/25 0089

## 2025-01-08 ENCOUNTER — PRE-ADMISSION TESTING (OUTPATIENT)
Dept: PREADMISSION TESTING | Facility: HOSPITAL | Age: 70
End: 2025-01-08
Payer: MEDICARE

## 2025-01-16 ENCOUNTER — APPOINTMENT (OUTPATIENT)
Dept: GASTROENTEROLOGY | Facility: HOSPITAL | Age: 70
End: 2025-01-16
Payer: MEDICARE

## 2025-01-16 ENCOUNTER — ANESTHESIA (OUTPATIENT)
Dept: GASTROENTEROLOGY | Facility: HOSPITAL | Age: 70
End: 2025-01-16

## 2025-03-19 ENCOUNTER — APPOINTMENT (OUTPATIENT)
Dept: CARDIOLOGY | Facility: HOSPITAL | Age: 70
End: 2025-03-19
Payer: MEDICARE

## 2025-06-17 ENCOUNTER — APPOINTMENT (OUTPATIENT)
Dept: PRIMARY CARE | Facility: CLINIC | Age: 70
End: 2025-06-17
Payer: MEDICARE

## 2025-06-17 VITALS
OXYGEN SATURATION: 98 % | HEIGHT: 68 IN | WEIGHT: 161 LBS | TEMPERATURE: 97.5 F | SYSTOLIC BLOOD PRESSURE: 136 MMHG | HEART RATE: 75 BPM | DIASTOLIC BLOOD PRESSURE: 82 MMHG | BODY MASS INDEX: 24.4 KG/M2

## 2025-06-17 DIAGNOSIS — K65.4 MESENTERIC PANNICULITIS (MULTI): ICD-10-CM

## 2025-06-17 DIAGNOSIS — I21.4 NON-ST ELEVATION MYOCARDIAL INFARCTION (NSTEMI) (MULTI): ICD-10-CM

## 2025-06-17 DIAGNOSIS — Z00.00 MEDICARE ANNUAL WELLNESS VISIT, SUBSEQUENT: Primary | ICD-10-CM

## 2025-06-17 DIAGNOSIS — F17.210 CIGARETTE NICOTINE DEPENDENCE, UNCOMPLICATED: ICD-10-CM

## 2025-06-17 DIAGNOSIS — I71.40 ABDOMINAL AORTIC ANEURYSM (AAA) GREATER THAN 39 MM IN DIAMETER: ICD-10-CM

## 2025-06-17 PROCEDURE — 1170F FXNL STATUS ASSESSED: CPT | Performed by: PHYSICIAN ASSISTANT

## 2025-06-17 PROCEDURE — 3079F DIAST BP 80-89 MM HG: CPT | Performed by: PHYSICIAN ASSISTANT

## 2025-06-17 PROCEDURE — 1036F TOBACCO NON-USER: CPT | Performed by: PHYSICIAN ASSISTANT

## 2025-06-17 PROCEDURE — 3008F BODY MASS INDEX DOCD: CPT | Performed by: PHYSICIAN ASSISTANT

## 2025-06-17 PROCEDURE — 3075F SYST BP GE 130 - 139MM HG: CPT | Performed by: PHYSICIAN ASSISTANT

## 2025-06-17 PROCEDURE — 1160F RVW MEDS BY RX/DR IN RCRD: CPT | Performed by: PHYSICIAN ASSISTANT

## 2025-06-17 PROCEDURE — G0439 PPPS, SUBSEQ VISIT: HCPCS | Performed by: PHYSICIAN ASSISTANT

## 2025-06-17 PROCEDURE — 1159F MED LIST DOCD IN RCRD: CPT | Performed by: PHYSICIAN ASSISTANT

## 2025-06-17 RX ORDER — ATORVASTATIN CALCIUM 20 MG/1
20 TABLET, FILM COATED ORAL
COMMUNITY

## 2025-06-17 ASSESSMENT — PATIENT HEALTH QUESTIONNAIRE - PHQ9
1. LITTLE INTEREST OR PLEASURE IN DOING THINGS: NOT AT ALL
2. FEELING DOWN, DEPRESSED OR HOPELESS: NOT AT ALL
SUM OF ALL RESPONSES TO PHQ9 QUESTIONS 1 AND 2: 0

## 2025-06-17 ASSESSMENT — ACTIVITIES OF DAILY LIVING (ADL)
DOING_HOUSEWORK: INDEPENDENT
DRESSING: INDEPENDENT
GROCERY_SHOPPING: INDEPENDENT
BATHING: INDEPENDENT
MANAGING_FINANCES: INDEPENDENT
TAKING_MEDICATION: INDEPENDENT

## 2025-06-17 ASSESSMENT — ENCOUNTER SYMPTOMS
DEPRESSION: 0
OCCASIONAL FEELINGS OF UNSTEADINESS: 0
LOSS OF SENSATION IN FEET: 0

## 2025-06-17 NOTE — PROGRESS NOTES
"Subjective   HPI   Chato Reilly is a 69 y.o. year old male patient with presenting to clinic with concern for Medicare Visit     Chief Complaint   Patient presents with    Medicare Annual Wellness Visit Subsequent       Also sees Dr Gerson Gonzalez at [facility]    HTN.  -losartan 25  BP Readings from Last 5 Encounters:   06/17/25 136/82   01/07/25 (!) 148/96   11/26/24 120/69   11/18/24 136/72   09/16/24 138/78      HLD  -lipitor 20qhs changing to rosuvastatin per [facility] note  No results found for: \"LDLCALC\"  LDL Cholesterol, Direct   Date Value Ref Range Status   03/27/2025 106.85 30 - 130 mg/dL Final   Comment:   <100 mg/dL, Optimal  100-129 mg/dL, Near optimal/above optimal  130-159 mg/dL, Borderline high  160-189 mg/dL, High  >189 mg/dL, Very high  Secondary prevention optimal LDL Cholesterol levels are recommended to be < 70 mg/dL       Cardiology  Dr Bueno  CAD   [id number]  AAA- infrarenal  CT- Increased size of infrarenal abdominal aortic aneurysms. The  superior aneurysm measures 3 cm x 2.8 cm. The inferior aneurysm  measures 4.1 cm x 3.7 cm. Previously, these measured 2.8 cm x 2.4 cm  and 3.7 cm x 3.1 cm, respectively.    Mesenteric ischemia   The ASCVD Risk score (Tony GONZALEZ, et al., 2019) failed to calculate for the following reasons:    Risk score cannot be calculated because patient has a medical history suggesting prior/existing ASCVD    BPH  LUTS  [facility] urology care  -finasteride  -tamsulosin 0.4  No results found for: \"PSA\" [facility]. Drawn in the past 6 months and under 1 per pt.     Low back pain without sciatica  Chronic pain  Tyler Christine CNP [facility]  -tramadol  Lumbar-   Multilevel spondylotic changes of the lumbar spine most advanced at L4-5 and  L5-S1 with moderate L4-5 facet arthropathy, grade 1 anterolisthesis  of L4 on L5 and broad-based disc bulge. There is moderate-severe  L5-S1 disc height loss. Chronic deformity of the right iliac body  with fusion of the SI " joint.    Mesenteric panniculitis  CT 2024  chronic, unchanged- not yet sclerosing (in )    LDCT DUE annually (due 2025)  scheduled by [facility]    Lab Results   Component Value Date    TSH 0.49 2023     Parotid cyst, Left- years ago.   IMPRESSION:   1. Successful aspiration of the previously identified 1 cm cystic lesion within the left parotid gland without complication. Please refer to the pathology report for tissue analysis.     2. Nonspecific incidental 7 mm nodule within the right thyroid gland.         Patient Care Team:  Jackie Magdaleno PA-C as PCP - General (Family Medicine)     Specialists    Past Medical, Surgical, and Family History reviewed and updated in chart.    Reviewed all medications by prescribing practitioner or clinical pharmacist (such as prescriptions, OTCs, herbal therapies and supplements) and documented in the medical record.     Preventative Health   Health Maintenance Due   Topic Date Due    Hepatitis C Screening  Never done    Pneumococcal Vaccination (1 of 2 - PCV) Never done    IPV Vaccine (2 of 3 - Adult catch-up series) 1976    Hepatitis B Vaccine (3 of 3 - 19+ 3-dose series) 1997    Screening for lung cancer  Never done    Abdominal Aortic Aneurysm (AAA) screening  Never done            Topic Date Due    IPV Vaccines (2 of 3 - Adult catch-up series) 1976    Hepatitis B Vaccines (3 of 3 - 19+ 3-dose series) 1997        Immunizations Reviewed  Immunization History   Administered Date(s) Administered    Tdap vaccine, age 7 year and older (BOOSTRIX, ADACEL) 2025        Problem List Reviewed  Problem List[1]    Medical History[2]   Surgical History[3]   Family History[4]   Social History     Tobacco Use    Smoking status: Former     Current packs/day: 0.00     Average packs/day: 1 pack/day for 46.0 years (46.0 ttl pk-yrs)     Types: Cigarettes     Start date:      Quit date: 2016     Years since quittin.4    Smokeless tobacco:  "Former     Types: Chew    Tobacco comments:     Stopped smoking when he got his cardiac stent   Substance Use Topics    Alcohol use: Never        Medications Reviewed  Medications Ordered Prior to Encounter[5]     Review of Systems  Constitutional: Denies fever  HEENT: Denies ST, earache  CVS: Denies Chest pain  Pulmonary: Denies wheezing, SOB  GI: Denies N/V  : Denies dysuria  Musculoskeletal:  Denies myalgia  Neuro: Denies focal weakness or numbness.  Skin: Denies Rashes.  *Review of Systems is negative unless otherwise mentioned in HPI or ROS above.      @OBJECTIVEBEGIN  /82   Pulse 75   Temp 36.4 °C (97.5 °F)   Ht 1.727 m (5' 8\")   Wt 73 kg (161 lb)   SpO2 98%   BMI 24.48 kg/m²  reviewed Body mass index is 24.48 kg/m².     Physical Exam  Constitutional: NAD. Afebrile. Resting comfortably.  ENT: Nasal mucosa and oropharynx: moist oral mucosa. Posterior oropharynx nonerythematous. No posterior pharyngeal streaking.  Eyes: PERRLA. EOM intact. No vertical or circular nystagmus.  Lymph: No anterior cervical chain or submandibular lymphadenopathy. No sentinel lymph nodes.  Cardiac: Regular rate & rhythm. No murmur, gallops, or rubs.  Pulmonary: Lungs clear to auscultation bilaterally with good aeration. No wheezes, rhonchi, or rales. Normal WOB.  GI: Soft, Nontender, nondistended. No guarding. Normal BS x4.  : No suprapubic tenderness. No CVA tenderness to percussion.   Musculoskeletal: No peripheral edema.   Skin: No evidence of trauma. No rashes  Neuro: No focal neuro deficits. Normal gait without assistive devices.  Psych: Intact judgement and insight.    MDM        .Assessment/Plan   Problem List Items Addressed This Visit           ICD-10-CM    MI (myocardial infarction) (Multi) I21.9     Other Visit Diagnoses         Codes      Medicare annual wellness visit, subsequent    -  Primary Z00.00      Mesenteric panniculitis (Multi)     K65.4      Abdominal aortic aneurysm (AAA) greater than 39 mm in " diameter     I71.40      Cigarette nicotine dependence, uncomplicated     F17.210    Relevant Orders    CT lung screening low dose                 [1]   Patient Active Problem List  Diagnosis    Stented coronary artery    Low back pain without sciatica    Former smoker    BPH (benign prostatic hyperplasia)    HTN (hypertension)    Hyperlipidemia    CAD (coronary artery disease)    MI (myocardial infarction) (Multi)    Aortic aneurysm    Gastroesophageal reflux disease   [2]   Past Medical History:  Diagnosis Date    Aortic aneurysm     BPH (benign prostatic hyperplasia)     GERD (gastroesophageal reflux disease)     HLD (hyperlipidemia)     resolved. On Lipitor to prevent MI    HTN (hypertension)     MI (myocardial infarction) (Multi) 12/2021    NSTEMI, stent placed   [3]   Past Surgical History:  Procedure Laterality Date    CORONARY STENT PLACEMENT      MR HEAD ANGIO WO IV CONTRAST  05/12/2023    MR HEAD ANGIO WO IV CONTRAST 5/12/2023 GEN MRI    MR NECK ANGIO WO IV CONTRAST  05/12/2023    MR NECK ANGIO WO IV CONTRAST 5/12/2023 GEN MRI   [4]   Family History  Problem Relation Name Age of Onset    No Known Problems Mother      Heart attack Father  36        Multiple heart attacks    Heart disease Father          CABG x 5    Prostate cancer Father      Esophageal cancer Sister      Stomach cancer Sister     [5]   Current Outpatient Medications on File Prior to Visit   Medication Sig Dispense Refill    aspirin 81 mg EC tablet Take 1 tablet (81 mg) by mouth once daily.      atorvastatin (Lipitor) 20 mg tablet Take 1 tablet (20 mg) by mouth once daily.      FINASTERIDE ORAL Take by mouth.      losartan (Cozaar) 25 mg tablet Take 1 tablet (25 mg) by mouth once daily. 90 tablet 3    meclizine (Antivert) 25 mg tablet Take 1 tablet (25 mg) by mouth 3 times a day as needed for dizziness.      tamsulosin (Flomax) 0.4 mg 24 hr capsule       traMADol (Ultram) 50 mg tablet Take 1 tablet (50 mg) by mouth 2 times a day as needed.       ondansetron ODT (Zofran-ODT) 4 mg disintegrating tablet Take 1 tablet (4 mg) by mouth every 8 hours if needed for nausea or vomiting. (Patient not taking: Reported on 11/18/2024) 30 tablet 0    polyethylene glycol (GoLYTELY) 236-22.74-6.74 -5.86 gram solution At 5pm, the night before your colonoscopy, take 8 ounces every 15 minutes until half the solution is gone (8 glasses). Refrigerate and repeat with the other half 5 hours prior to your colonoscopy. (Patient not taking: Reported on 6/17/2025) 4000 mL 0     No current facility-administered medications on file prior to visit.

## 2026-06-18 ENCOUNTER — APPOINTMENT (OUTPATIENT)
Dept: PRIMARY CARE | Facility: CLINIC | Age: 71
End: 2026-06-18
Payer: MEDICARE